# Patient Record
Sex: MALE | Race: WHITE | NOT HISPANIC OR LATINO | Employment: OTHER | URBAN - NONMETROPOLITAN AREA
[De-identification: names, ages, dates, MRNs, and addresses within clinical notes are randomized per-mention and may not be internally consistent; named-entity substitution may affect disease eponyms.]

---

## 2020-06-15 ENCOUNTER — APPOINTMENT (OUTPATIENT)
Dept: ULTRASOUND IMAGING | Facility: HOSPITAL | Age: 85
End: 2020-06-15

## 2020-06-15 ENCOUNTER — APPOINTMENT (OUTPATIENT)
Dept: MRI IMAGING | Facility: HOSPITAL | Age: 85
End: 2020-06-15

## 2020-06-15 ENCOUNTER — HOSPITAL ENCOUNTER (INPATIENT)
Facility: HOSPITAL | Age: 85
LOS: 4 days | Discharge: HOME | End: 2020-06-19
Attending: INTERNAL MEDICINE | Admitting: INTERNAL MEDICINE

## 2020-06-15 DIAGNOSIS — K81.9 CHOLECYSTITIS: ICD-10-CM

## 2020-06-15 PROBLEM — K85.90 ACUTE PANCREATITIS: Status: ACTIVE | Noted: 2020-06-15

## 2020-06-15 LAB
ALBUMIN SERPL-MCNC: 3.3 G/DL (ref 3.5–5.2)
ALBUMIN SERPL-MCNC: 3.3 G/DL (ref 3.5–5.2)
ALBUMIN/GLOB SERPL: 1.1 G/DL
ALBUMIN/GLOB SERPL: 1.1 G/DL
ALP SERPL-CCNC: 155 U/L (ref 39–117)
ALP SERPL-CCNC: 160 U/L (ref 39–117)
ALT SERPL W P-5'-P-CCNC: 87 U/L (ref 1–41)
ALT SERPL W P-5'-P-CCNC: 87 U/L (ref 1–41)
AMYLASE SERPL-CCNC: 872 U/L (ref 28–100)
ANION GAP SERPL CALCULATED.3IONS-SCNC: 9 MMOL/L (ref 5–15)
ANION GAP SERPL CALCULATED.3IONS-SCNC: 9 MMOL/L (ref 5–15)
AST SERPL-CCNC: 141 U/L (ref 1–40)
AST SERPL-CCNC: 144 U/L (ref 1–40)
BASOPHILS # BLD AUTO: 0.02 10*3/MM3 (ref 0–0.2)
BASOPHILS # BLD AUTO: 0.04 10*3/MM3 (ref 0–0.2)
BASOPHILS NFR BLD AUTO: 0.1 % (ref 0–1.5)
BASOPHILS NFR BLD AUTO: 0.2 % (ref 0–1.5)
BILIRUB SERPL-MCNC: 4.4 MG/DL (ref 0.2–1.2)
BILIRUB SERPL-MCNC: 4.5 MG/DL (ref 0.2–1.2)
BUN BLD-MCNC: 25 MG/DL (ref 8–23)
BUN BLD-MCNC: 25 MG/DL (ref 8–23)
BUN/CREAT SERPL: 17.1 (ref 7–25)
BUN/CREAT SERPL: 17.6 (ref 7–25)
CALCIUM SPEC-SCNC: 8.8 MG/DL (ref 8.6–10.5)
CALCIUM SPEC-SCNC: 8.9 MG/DL (ref 8.6–10.5)
CHLORIDE SERPL-SCNC: 105 MMOL/L (ref 98–107)
CHLORIDE SERPL-SCNC: 106 MMOL/L (ref 98–107)
CO2 SERPL-SCNC: 22 MMOL/L (ref 22–29)
CO2 SERPL-SCNC: 23 MMOL/L (ref 22–29)
CREAT BLD-MCNC: 1.42 MG/DL (ref 0.76–1.27)
CREAT BLD-MCNC: 1.46 MG/DL (ref 0.76–1.27)
DEPRECATED RDW RBC AUTO: 40.6 FL (ref 37–54)
DEPRECATED RDW RBC AUTO: 41.7 FL (ref 37–54)
EOSINOPHIL # BLD AUTO: 0 10*3/MM3 (ref 0–0.4)
EOSINOPHIL # BLD AUTO: 0.01 10*3/MM3 (ref 0–0.4)
EOSINOPHIL NFR BLD AUTO: 0 % (ref 0.3–6.2)
EOSINOPHIL NFR BLD AUTO: 0.1 % (ref 0.3–6.2)
ERYTHROCYTE [DISTWIDTH] IN BLOOD BY AUTOMATED COUNT: 12.6 % (ref 12.3–15.4)
ERYTHROCYTE [DISTWIDTH] IN BLOOD BY AUTOMATED COUNT: 12.7 % (ref 12.3–15.4)
GFR SERPL CREATININE-BSD FRML MDRD: 46 ML/MIN/1.73
GFR SERPL CREATININE-BSD FRML MDRD: 48 ML/MIN/1.73
GLOBULIN UR ELPH-MCNC: 3 GM/DL
GLOBULIN UR ELPH-MCNC: 3 GM/DL
GLUCOSE BLD-MCNC: 129 MG/DL (ref 65–99)
GLUCOSE BLD-MCNC: 130 MG/DL (ref 65–99)
HCT VFR BLD AUTO: 34 % (ref 37.5–51)
HCT VFR BLD AUTO: 34.2 % (ref 37.5–51)
HGB BLD-MCNC: 11.8 G/DL (ref 13–17.7)
HGB BLD-MCNC: 11.9 G/DL (ref 13–17.7)
IMM GRANULOCYTES # BLD AUTO: 0.13 10*3/MM3 (ref 0–0.05)
IMM GRANULOCYTES # BLD AUTO: 0.13 10*3/MM3 (ref 0–0.05)
IMM GRANULOCYTES NFR BLD AUTO: 0.7 % (ref 0–0.5)
IMM GRANULOCYTES NFR BLD AUTO: 0.8 % (ref 0–0.5)
LIPASE SERPL-CCNC: 1457 U/L (ref 13–60)
LYMPHOCYTES # BLD AUTO: 1.36 10*3/MM3 (ref 0.7–3.1)
LYMPHOCYTES # BLD AUTO: 1.54 10*3/MM3 (ref 0.7–3.1)
LYMPHOCYTES NFR BLD AUTO: 8 % (ref 19.6–45.3)
LYMPHOCYTES NFR BLD AUTO: 8.8 % (ref 19.6–45.3)
MAGNESIUM SERPL-MCNC: 1.8 MG/DL (ref 1.6–2.4)
MCH RBC QN AUTO: 30.8 PG (ref 26.6–33)
MCH RBC QN AUTO: 31.1 PG (ref 26.6–33)
MCHC RBC AUTO-ENTMCNC: 34.7 G/DL (ref 31.5–35.7)
MCHC RBC AUTO-ENTMCNC: 34.8 G/DL (ref 31.5–35.7)
MCV RBC AUTO: 88.8 FL (ref 79–97)
MCV RBC AUTO: 89.3 FL (ref 79–97)
MONOCYTES # BLD AUTO: 1.43 10*3/MM3 (ref 0.1–0.9)
MONOCYTES # BLD AUTO: 1.44 10*3/MM3 (ref 0.1–0.9)
MONOCYTES NFR BLD AUTO: 8.2 % (ref 5–12)
MONOCYTES NFR BLD AUTO: 8.5 % (ref 5–12)
NEUTROPHILS # BLD AUTO: 14.04 10*3/MM3 (ref 1.7–7)
NEUTROPHILS # BLD AUTO: 14.36 10*3/MM3 (ref 1.7–7)
NEUTROPHILS NFR BLD AUTO: 82.1 % (ref 42.7–76)
NEUTROPHILS NFR BLD AUTO: 82.5 % (ref 42.7–76)
NRBC BLD AUTO-RTO: 0 /100 WBC (ref 0–0.2)
NRBC BLD AUTO-RTO: 0 /100 WBC (ref 0–0.2)
PHOSPHATE SERPL-MCNC: 3.2 MG/DL (ref 2.5–4.5)
PLATELET # BLD AUTO: 137 10*3/MM3 (ref 140–450)
PLATELET # BLD AUTO: 138 10*3/MM3 (ref 140–450)
PMV BLD AUTO: 10.4 FL (ref 6–12)
PMV BLD AUTO: 10.5 FL (ref 6–12)
POTASSIUM BLD-SCNC: 4.4 MMOL/L (ref 3.5–5.2)
POTASSIUM BLD-SCNC: 4.5 MMOL/L (ref 3.5–5.2)
PROT SERPL-MCNC: 6.3 G/DL (ref 6–8.5)
PROT SERPL-MCNC: 6.3 G/DL (ref 6–8.5)
RBC # BLD AUTO: 3.83 10*6/MM3 (ref 4.14–5.8)
RBC # BLD AUTO: 3.83 10*6/MM3 (ref 4.14–5.8)
SODIUM BLD-SCNC: 136 MMOL/L (ref 136–145)
SODIUM BLD-SCNC: 138 MMOL/L (ref 136–145)
TRIGL SERPL-MCNC: 78 MG/DL (ref 0–150)
TSH SERPL DL<=0.05 MIU/L-ACNC: 0.9 UIU/ML (ref 0.27–4.2)
WBC NRBC COR # BLD: 17 10*3/MM3 (ref 3.4–10.8)
WBC NRBC COR # BLD: 17.5 10*3/MM3 (ref 3.4–10.8)

## 2020-06-15 PROCEDURE — 83735 ASSAY OF MAGNESIUM: CPT | Performed by: INTERNAL MEDICINE

## 2020-06-15 PROCEDURE — 87040 BLOOD CULTURE FOR BACTERIA: CPT | Performed by: NURSE PRACTITIONER

## 2020-06-15 PROCEDURE — 99232 SBSQ HOSP IP/OBS MODERATE 35: CPT | Performed by: INTERNAL MEDICINE

## 2020-06-15 PROCEDURE — 84478 ASSAY OF TRIGLYCERIDES: CPT | Performed by: NURSE PRACTITIONER

## 2020-06-15 PROCEDURE — 85025 COMPLETE CBC W/AUTO DIFF WBC: CPT | Performed by: INTERNAL MEDICINE

## 2020-06-15 PROCEDURE — 76705 ECHO EXAM OF ABDOMEN: CPT

## 2020-06-15 PROCEDURE — 80053 COMPREHEN METABOLIC PANEL: CPT | Performed by: INTERNAL MEDICINE

## 2020-06-15 PROCEDURE — 84443 ASSAY THYROID STIM HORMONE: CPT | Performed by: INTERNAL MEDICINE

## 2020-06-15 PROCEDURE — 83690 ASSAY OF LIPASE: CPT | Performed by: INTERNAL MEDICINE

## 2020-06-15 PROCEDURE — 82150 ASSAY OF AMYLASE: CPT | Performed by: INTERNAL MEDICINE

## 2020-06-15 PROCEDURE — 25010000002 PIPERACILLIN SOD-TAZOBACTAM PER 1 G: Performed by: INTERNAL MEDICINE

## 2020-06-15 PROCEDURE — 84100 ASSAY OF PHOSPHORUS: CPT | Performed by: INTERNAL MEDICINE

## 2020-06-15 RX ORDER — SODIUM CHLORIDE 9 MG/ML
100 INJECTION, SOLUTION INTRAVENOUS CONTINUOUS
Status: DISCONTINUED | OUTPATIENT
Start: 2020-06-15 | End: 2020-06-19 | Stop reason: HOSPADM

## 2020-06-15 RX ORDER — ONDANSETRON 4 MG/1
4 TABLET, FILM COATED ORAL EVERY 6 HOURS PRN
Status: DISCONTINUED | OUTPATIENT
Start: 2020-06-15 | End: 2020-06-19 | Stop reason: HOSPADM

## 2020-06-15 RX ORDER — ACETAMINOPHEN 160 MG/5ML
650 SOLUTION ORAL EVERY 4 HOURS PRN
Status: DISCONTINUED | OUTPATIENT
Start: 2020-06-15 | End: 2020-06-18

## 2020-06-15 RX ORDER — FAMOTIDINE 40 MG/1
40 TABLET, FILM COATED ORAL DAILY
Status: DISCONTINUED | OUTPATIENT
Start: 2020-06-15 | End: 2020-06-19 | Stop reason: HOSPADM

## 2020-06-15 RX ORDER — LANOLIN ALCOHOL/MO/W.PET/CERES
5 CREAM (GRAM) TOPICAL NIGHTLY PRN
Status: DISCONTINUED | OUTPATIENT
Start: 2020-06-15 | End: 2020-06-19 | Stop reason: HOSPADM

## 2020-06-15 RX ORDER — BISACODYL 5 MG/1
5 TABLET, DELAYED RELEASE ORAL DAILY PRN
Status: DISCONTINUED | OUTPATIENT
Start: 2020-06-15 | End: 2020-06-19 | Stop reason: HOSPADM

## 2020-06-15 RX ORDER — ONDANSETRON 2 MG/ML
4 INJECTION INTRAMUSCULAR; INTRAVENOUS EVERY 6 HOURS PRN
Status: DISCONTINUED | OUTPATIENT
Start: 2020-06-15 | End: 2020-06-19 | Stop reason: HOSPADM

## 2020-06-15 RX ORDER — FUROSEMIDE 20 MG/1
20 TABLET ORAL DAILY PRN
COMMUNITY

## 2020-06-15 RX ORDER — BISACODYL 10 MG
10 SUPPOSITORY, RECTAL RECTAL DAILY PRN
Status: DISCONTINUED | OUTPATIENT
Start: 2020-06-15 | End: 2020-06-19 | Stop reason: HOSPADM

## 2020-06-15 RX ORDER — ACETAMINOPHEN 325 MG/1
650 TABLET ORAL EVERY 4 HOURS PRN
Status: DISCONTINUED | OUTPATIENT
Start: 2020-06-15 | End: 2020-06-18

## 2020-06-15 RX ORDER — METOPROLOL TARTRATE 100 MG/1
100 TABLET ORAL 2 TIMES DAILY
COMMUNITY

## 2020-06-15 RX ORDER — CALCIUM CARBONATE 200(500)MG
2 TABLET,CHEWABLE ORAL 2 TIMES DAILY PRN
Status: DISCONTINUED | OUTPATIENT
Start: 2020-06-15 | End: 2020-06-19 | Stop reason: HOSPADM

## 2020-06-15 RX ORDER — CHOLECALCIFEROL (VITAMIN D3) 125 MCG
500 CAPSULE ORAL DAILY
COMMUNITY

## 2020-06-15 RX ORDER — ACETAMINOPHEN 650 MG/1
650 SUPPOSITORY RECTAL EVERY 4 HOURS PRN
Status: DISCONTINUED | OUTPATIENT
Start: 2020-06-15 | End: 2020-06-18

## 2020-06-15 RX ORDER — SODIUM CHLORIDE 0.9 % (FLUSH) 0.9 %
10 SYRINGE (ML) INJECTION AS NEEDED
Status: DISCONTINUED | OUTPATIENT
Start: 2020-06-15 | End: 2020-06-19 | Stop reason: HOSPADM

## 2020-06-15 RX ORDER — SODIUM CHLORIDE 0.9 % (FLUSH) 0.9 %
10 SYRINGE (ML) INJECTION EVERY 12 HOURS SCHEDULED
Status: DISCONTINUED | OUTPATIENT
Start: 2020-06-15 | End: 2020-06-19 | Stop reason: HOSPADM

## 2020-06-15 RX ADMIN — SODIUM CHLORIDE, PRESERVATIVE FREE 10 ML: 5 INJECTION INTRAVENOUS at 20:23

## 2020-06-15 RX ADMIN — PIPERACILLIN SODIUM AND TAZOBACTAM SODIUM 3.38 G: 3; .375 INJECTION, POWDER, LYOPHILIZED, FOR SOLUTION INTRAVENOUS at 20:22

## 2020-06-15 RX ADMIN — PIPERACILLIN SODIUM AND TAZOBACTAM SODIUM 3.38 G: 3; .375 INJECTION, POWDER, LYOPHILIZED, FOR SOLUTION INTRAVENOUS at 11:56

## 2020-06-15 RX ADMIN — PIPERACILLIN SODIUM AND TAZOBACTAM SODIUM 3.38 G: 3; .375 INJECTION, POWDER, LYOPHILIZED, FOR SOLUTION INTRAVENOUS at 06:11

## 2020-06-15 NOTE — CONSULTS
SUBJECTIVE:   6/15/2020    Name: Kory Montalvo  DOD: 1935    REASON FOR CONSULT: Acute pancreatitis    Chief Complaint:   No chief complaint on file.      Subjective     Patient is 84 y.o. male with past medical history of hypertension, tobacco usage presents with upper abdominal pain associated with nausea and vomiting of 3 days duration.  Patient had progressively worsening symptoms and subsequently presented to the emergency room at [facility].  He was noted to have elevated lipase of over 3000 and elevated liver enzymes with CT abdomen and pelvis consistent with dilated gallbladder with pericholecystic fluid and thickened wall.  CT report did not mention anything about pancreas.  He was also noted to have elevated white count at 17,000.  Patient feels better currently.  Abdominal pain is improving.  No further nausea or vomiting.  He denied history of similar symptoms in the past.  Also denied history of alcohol usage.  Right upper quadrant sonogram was consistent with well distended and normal-appearing gallbladder and 4 mm common bile duct.  No pericholecystic fluid or gallbladder wall thickening noted.  Lipase this morning was 1457.     ROS/HISTORY/ CURRENT MEDICATIONS/OBJECTIVE/VS/PE:   Review of Systems:   Review of Systems   Constitutional: Negative for chills, fatigue, fever and unexpected weight change.   HENT: Negative for congestion, ear discharge, hearing loss, nosebleeds and sore throat.    Eyes: Negative for pain, discharge and redness.   Respiratory: Negative for cough, chest tightness, shortness of breath and wheezing.    Cardiovascular: Negative for chest pain and palpitations.   Gastrointestinal: Positive for abdominal pain, nausea and vomiting. Negative for abdominal distention, blood in stool, constipation and diarrhea.   Endocrine: Negative for cold intolerance, polydipsia, polyphagia and polyuria.   Genitourinary: Negative for dysuria, flank pain, frequency, hematuria  and urgency.   Musculoskeletal: Negative for arthralgias, back pain, joint swelling and myalgias.   Skin: Negative for color change, pallor and rash.   Neurological: Negative for tremors, seizures, syncope, weakness and headaches.   Hematological: Negative for adenopathy. Does not bruise/bleed easily.   Psychiatric/Behavioral: Negative for behavioral problems, confusion, dysphoric mood, hallucinations and suicidal ideas. The patient is not nervous/anxious.        History:   No past medical history on file.  No past surgical history on file.  No family history on file.  Social History     Tobacco Use   • Smoking status: Former Smoker     Packs/day: 1.50     Years: 52.00     Pack years: 78.00     Types: Cigarettes   • Smokeless tobacco: Former User     Types: Chew   Substance Use Topics   • Alcohol use: Not on file   • Drug use: Not on file     Medications Prior to Admission   Medication Sig Dispense Refill Last Dose   • furosemide (LASIX) 20 MG tablet Take 20 mg by mouth Daily As Needed. 1/2 tablet prn for leg swelling      • metoprolol tartrate (LOPRESSOR) 100 MG tablet Take 100 mg by mouth 2 (Two) Times a Day.      • vitamin B-12 (CYANOCOBALAMIN) 500 MCG tablet Take 500 mcg by mouth Daily.        Allergies:  Patient has no known allergies.    I have reviewed the patient's medical history, surgical history and family history in the available medical record system.     Current Medications:     Current Facility-Administered Medications   Medication Dose Route Frequency Provider Last Rate Last Dose   • acetaminophen (TYLENOL) tablet 650 mg  650 mg Oral Q4H PRN Vincent Ambrose MD        Or   • acetaminophen (TYLENOL) 160 MG/5ML solution 650 mg  650 mg Oral Q4H PRN Vincent Ambrose MD        Or   • acetaminophen (TYLENOL) suppository 650 mg  650 mg Rectal Q4H PRN Vincent Ambrose MD       • bisacodyl (DULCOLAX) EC tablet 5 mg  5 mg Oral Daily PRN Vincent Ambrose MD       • bisacodyl (DULCOLAX) suppository 10 mg  10 mg  Rectal Daily PRN Vincent Ambrose MD       • calcium carbonate (TUMS) chewable tablet 500 mg (200 mg elemental)  2 tablet Oral BID PRN Vincent Ambrose MD       • famotidine (PEPCID) tablet 40 mg  40 mg Oral Daily Vincent Ambrose MD       • HYDROmorphone (DILAUDID) injection 0.5 mg  0.5 mg Intravenous Q4H PRN Vincent Ambrose MD       • magnesium hydroxide (MILK OF MAGNESIA) suspension 2400 mg/10mL 10 mL  10 mL Oral Daily PRN Vincent Ambrose MD       • melatonin tablet 5.25 mg  5.25 mg Oral Nightly PRN Vincent Ambrose MD       • ondansetron (ZOFRAN) tablet 4 mg  4 mg Oral Q6H PRN Vincent Ambrose MD        Or   • ondansetron (ZOFRAN) injection 4 mg  4 mg Intravenous Q6H PRN Vincent Ambrose MD       • Pharmacy to Dose Zosyn   Does not apply Continuous PRN Vincent Ambrose MD       • piperacillin-tazobactam (ZOSYN) 3.375 g/100 mL 0.9% NS IVPB (mbp)  3.375 g Intravenous Q8H Vincent Ambrose MD   3.375 g at 06/15/20 1156   • sodium chloride 0.9 % flush 10 mL  10 mL Intravenous Q12H Vincent Ambrose MD       • sodium chloride 0.9 % flush 10 mL  10 mL Intravenous PRN Vincent Ambrose MD       • sodium chloride 0.9 % infusion  125 mL/hr Intravenous Continuous Chris Rojo, APPLE 125 mL/hr at 06/15/20 1337 125 mL/hr at 06/15/20 1337       Objective     Physical Exam:   Temp:  [96.6 °F (35.9 °C)-98.9 °F (37.2 °C)] 96.6 °F (35.9 °C)  Heart Rate:  [61-73] 61  Resp:  [18] 18  BP: (141-154)/(66-75) 141/66    Physical Exam:  General Appearance:    Alert, cooperative, in no acute distress   Head:    Normocephalic, without obvious abnormality, atraumatic   Eyes:            Lids and lashes normal, conjunctivae and sclerae normal, no   icterus, no pallor, corneas clear, PERRLA   Ears:    Ears appear intact with no abnormalities noted   Throat:   No oral lesions, no thrush, oral mucosa moist   Neck:   No adenopathy, supple, trachea midline, no thyromegaly, no     carotid bruit, no JVD   Back:     No kyphosis present, no scoliosis  present, no skin lesions,       erythema or scars, no tenderness to percussion or                   palpation,   range of motion normal   Lungs:     Clear to auscultation,respirations regular, even and                   unlabored    Heart:    Regular rhythm and normal rate, normal S1 and S2, no            murmur, no gallop, no rub, no click   Breast Exam:    Deferred   Abdomen:     Normal bowel sounds, no masses, no organomegaly, soft        non-tender, non-distended, no guarding, no rebound                 tenderness   Genitalia:    Deferred   Extremities:   Moves all extremities well, no edema, no cyanosis, no              redness   Pulses:   Pulses palpable and equal bilaterally   Skin:   No bleeding, bruising or rash   Lymph nodes:   No palpable adenopathy   Neurologic:   Cranial nerves 2 - 12 grossly intact, sensation intact, DTR        present and equal bilaterally      Results Review:     Lab Results   Component Value Date    WBC 17.50 (H) 06/15/2020    WBC 17.00 (H) 06/15/2020    HGB 11.8 (L) 06/15/2020    HGB 11.9 (L) 06/15/2020    HCT 34.0 (L) 06/15/2020    HCT 34.2 (L) 06/15/2020     (L) 06/15/2020     (L) 06/15/2020     Results from last 7 days   Lab Units 06/15/20  0601   ALK PHOS U/L 155*  160*   ALT (SGPT) U/L 87*  87*   AST (SGOT) U/L 141*  144*     Results from last 7 days   Lab Units 06/15/20  0601   BILIRUBIN mg/dL 4.4*  4.5*   ALK PHOS U/L 155*  160*     Lipase   Date Value Ref Range Status   06/15/2020 1,457 (H) 13 - 60 U/L Final     No results found for: INR      Radiology Review:  Imaging Results (Last 72 Hours)     Procedure Component Value Units Date/Time    US Gallbladder [[id number]] Collected:  06/15/20 1058     Updated:  06/15/20 1146    Narrative:       EXAM DESCRIPTION:     US GALLBLADDER    CLINICAL HISTORY:     84 years  Male  acute pancreatitis    COMPARISON:     None available    TECHNIQUE:     Transverse and longitudinal images were obtained throughout the  right  upper quadrant from a transabdominal approach.    FINDINGS:     The gallbladder is well-distended. There is no evidence of  cholelithiasis, gallbladder wall thickening or pericholecystic  fluid. The common bile duct measures 4 mm in diameter without  evidence of intra or extrahepatic bile or dilatation.    Images through the liver demonstrate no focal abnormality with  normal echogenicity. The right kidney demonstrates no evidence of  obstruction. There is diffuse cortical thinning of the right  renal parenchyma.    The pancreas is fairly well visualized and demonstrates no focal  abnormality. There are no peripancreatic fluid  collections/effusion and/or pseudocyst identified.      Impression:           1. No evidence of cholelithiasis.  2. Unremarkable ultrasonographic appearance of the pancreas.  3. Mild cortical thinning of the right renal cortex.        Electronically signed by:  Teresita Richards MD  6/15/2020 11:45 AM  CDT Workstation: [id number]          I reviewed the patient's new clinical results.    I reviewed the patient's new imaging results and agree with the interpretation.     ASSESSMENT/PLAN:   ASSESSMENT: 1.  Upper abdominal pain with nausea and vomiting likely due to acute pancreatitis.  Likely biliary pancreatitis but no gallstones seen upon right upper quadrant sonogram or CT abdomen and pelvis.  Need to rule out neoplasia given the advanced age of the patient.  2.  Elevated liver enzymes, improving.    PLAN: 1.  Continue bowel rest, antibiotics and PPI.  2.  Obtain MRCP for further evaluation of pancreas, gallbladder and CBD..  3.  Follow LFTs closely and avoid hypotension and hepatotoxic medications.  The risks, benefits, and alternatives of this procedure have been discussed with the patient or the responsible party. The patient understands and agrees to proceed.         Hipolito Stringer MD  06/15/20  15:03

## 2020-06-15 NOTE — PLAN OF CARE
Problem: Patient Care Overview  Goal: Plan of Care Review  Outcome: Ongoing (interventions implemented as appropriate)  Flowsheets (Taken 6/15/2020 5684)  Progress: no change  Plan of Care Reviewed With: patient  Outcome Summary: vss. no complaints of pain or discomfort. resting between care. will continue to monitor.

## 2020-06-15 NOTE — H&P
[facility] Medicine Admission      Date of Admission: 6/15/2020      Primary Care Physician: Provider, No Known      Chief Complaint:     Abdominal pain, vomiting  HPI:    84 of gentleman with past medical history significant for hypertension who presented to the hospital with complaints of abdominal pain from an outside facility.  Patient reports that his abdominal pain started about 3 days ago.  It was gradual in onset, progressive, aggravated by movement, 10/10 intensity at its peak, dull aching pain.  It is primarily located in epigastric as well as left upper quadrant region.  He also had associated nausea and multiple episodes of vomiting.  He denies any history of alcohol abuse or any previous history of pancreatitis.    On arrival to the ER at outside facility, he was found to have acute pancreatitis.  CT scan abdomen/pelvis was also remarkable for pericholecystic fluid.  He was given IV antibiotics and GI was contacted from the outside facility who recommended the patient to be transferred to our service for further evaluation and management.        Concurrent Medical History:  has no past medical history on file.  Hypertension    Past Surgical History:  has no past surgical history on file.  None    Family History: family history is not on file.  Hypertension    Social History:  reports that he has quit smoking. His smoking use included cigarettes. He has a 78.00 pack-year smoking history. He has quit using smokeless tobacco.  His smokeless tobacco use included chew.    Allergies: No Known Allergies    Medications:   Prior to Admission medications    Medication Sig Start Date End Date Taking? Authorizing Provider   furosemide (LASIX) 20 MG tablet Take 20 mg by mouth Daily As Needed. 1/2 tablet prn for leg swelling   Yes Theron Merlos MD   metoprolol tartrate (LOPRESSOR) 100 MG tablet Take 100 mg by mouth 2 (Two) Times a Day.   Yes Theron Merlos MD    vitamin B-12 (CYANOCOBALAMIN) 500 MCG tablet Take 500 mcg by mouth Daily.   Yes Provider, Theron, MD       Review of Systems:  Review of Systems   Otherwise complete ROS is negative except as mentioned above.    Physical Exam:   Temp:  [98.9 °F (37.2 °C)] 98.9 °F (37.2 °C)  Heart Rate:  [64-73] 64  Resp:  [18] 18  BP: (142-154)/(67-75) 154/67  Physical Exam    General: [Appears stated age, alert, oriented ×3, cooperative]  HEENT: [Normocephalic, atraumatic, EOMI, PERRLA, unremarkable external ear, moist mucous membranes, supple neck, no lymphadenopathy]  CVS: [RRR, S1, S2, no murmurs, normal peripheral pulses]  Respiratory: [CTA bilaterally, symmetrical expansion, no wheezing, no rales, no crackles]  Gastrointestinal: [Soft, epigastric tenderness without any guarding/rigidity/rebound]  Musculoskeletal: [Grossly normal, no tenderness, normal ROM]  Skin: [No rashes, no erythema, no lesions]  Extremities: [Normal inspection.  No edema, no cyanosis, no clubbing.  Normal capillary refill]  Neuro: [Alert, oriented ×3, grossly normal motor and sensory function]  Psychiatry: [No anxiety, no depression, nonsuicidal]      Results Reviewed:  I have personally reviewed current lab, radiology, and data and agree with results.  Lab Results (last 24 hours)     Procedure Component Value Units Date/Time    TSH [[id number]]  (Normal) Collected:  06/15/20 0601    Specimen:  Blood Updated:  06/15/20 0657     TSH 0.902 uIU/mL     Comprehensive Metabolic Panel [[id number]]  (Abnormal) Collected:  06/15/20 0601    Specimen:  Blood Updated:  06/15/20 0655     Glucose 129 mg/dL      BUN 25 mg/dL      Creatinine 1.42 mg/dL      Sodium 136 mmol/L      Potassium 4.4 mmol/L      Chloride 105 mmol/L      CO2 22.0 mmol/L      Calcium 8.8 mg/dL      Total Protein 6.3 g/dL      Albumin 3.30 g/dL      ALT (SGPT) 87 U/L      AST (SGOT) 141 U/L      Alkaline Phosphatase 155 U/L      Total Bilirubin 4.4 mg/dL      eGFR Non  Amer 48  mL/min/1.73      Globulin 3.0 gm/dL      A/G Ratio 1.1 g/dL      BUN/Creatinine Ratio 17.6     Anion Gap 9.0 mmol/L     Narrative:       GFR Normal >60  Chronic Kidney Disease <60  Kidney Failure <15      Comprehensive Metabolic Panel [[id number]]  (Abnormal) Collected:  06/15/20 0601    Specimen:  Blood Updated:  06/15/20 0652     Glucose 130 mg/dL      BUN 25 mg/dL      Creatinine 1.46 mg/dL      Sodium 138 mmol/L      Potassium 4.5 mmol/L      Chloride 106 mmol/L      CO2 23.0 mmol/L      Calcium 8.9 mg/dL      Total Protein 6.3 g/dL      Albumin 3.30 g/dL      ALT (SGPT) 87 U/L      AST (SGOT) 144 U/L      Alkaline Phosphatase 160 U/L      Total Bilirubin 4.5 mg/dL      eGFR Non African Amer 46 mL/min/1.73      Globulin 3.0 gm/dL      A/G Ratio 1.1 g/dL      BUN/Creatinine Ratio 17.1     Anion Gap 9.0 mmol/L     Narrative:       GFR Normal >60  Chronic Kidney Disease <60  Kidney Failure <15      Magnesium [[id number]]  (Normal) Collected:  06/15/20 0601    Specimen:  Blood Updated:  06/15/20 0652     Magnesium 1.8 mg/dL     Phosphorus [[id number]]  (Normal) Collected:  06/15/20 0601    Specimen:  Blood Updated:  06/15/20 0652     Phosphorus 3.2 mg/dL     CBC Auto Differential [[id number]]  (Abnormal) Collected:  06/15/20 0601    Specimen:  Blood Updated:  06/15/20 0628     WBC 17.50 10*3/mm3      RBC 3.83 10*6/mm3      Hemoglobin 11.8 g/dL      Hematocrit 34.0 %      MCV 88.8 fL      MCH 30.8 pg      MCHC 34.7 g/dL      RDW 12.6 %      RDW-SD 40.6 fl      MPV 10.5 fL      Platelets 137 10*3/mm3      Neutrophil % 82.1 %      Lymphocyte % 8.8 %      Monocyte % 8.2 %      Eosinophil % 0.0 %      Basophil % 0.2 %      Immature Grans % 0.7 %      Neutrophils, Absolute 14.36 10*3/mm3      Lymphocytes, Absolute 1.54 10*3/mm3      Monocytes, Absolute 1.43 10*3/mm3      Eosinophils, Absolute 0.00 10*3/mm3      Basophils, Absolute 0.04 10*3/mm3      Immature Grans, Absolute 0.13 10*3/mm3      nRBC 0.0 /100 WBC     CBC &  Differential [[id number]] Collected:  06/15/20 0601    Specimen:  Blood Updated:  06/15/20 0626    Narrative:       The following orders were created for panel order CBC & Differential.  Procedure                               Abnormality         Status                     ---------                               -----------         ------                     CBC Auto Differential[[id number]]        Abnormal            Final result                 Please view results for these tests on the individual orders.    CBC Auto Differential [[id number]]  (Abnormal) Collected:  06/15/20 0601    Specimen:  Blood Updated:  06/15/20 0626     WBC 17.00 10*3/mm3      RBC 3.83 10*6/mm3      Hemoglobin 11.9 g/dL      Hematocrit 34.2 %      MCV 89.3 fL      MCH 31.1 pg      MCHC 34.8 g/dL      RDW 12.7 %      RDW-SD 41.7 fl      MPV 10.4 fL      Platelets 138 10*3/mm3      Neutrophil % 82.5 %      Lymphocyte % 8.0 %      Monocyte % 8.5 %      Eosinophil % 0.1 %      Basophil % 0.1 %      Immature Grans % 0.8 %      Neutrophils, Absolute 14.04 10*3/mm3      Lymphocytes, Absolute 1.36 10*3/mm3      Monocytes, Absolute 1.44 10*3/mm3      Eosinophils, Absolute 0.01 10*3/mm3      Basophils, Absolute 0.02 10*3/mm3      Immature Grans, Absolute 0.13 10*3/mm3      nRBC 0.0 /100 WBC         Imaging Results (Last 24 Hours)     ** No results found for the last 24 hours. **            Assessment:    Active Hospital Problems    Diagnosis   • Acute pancreatitis             Plan:  84-year-old gentleman with past medical history significant for hypertension who presented to the hospital with complaint of abdominal pain likely secondary to acute pancreatitis and possible CBD obstruction.    Acute pancreatitis:  Admit to the [facility] bed  Close vitals monitoring  N.p.o.  IV Dilaudid as needed for pain control  IV fluids  GI consult    ?  Acute cholecystitis:  Pericholecystic fluid on CT scan abdomen/pelvis from outside facility  Initiated on IV Zosyn    ?   CBD obstruction:  Hepatic function panel consistent with possible CBD obstruction  GI consult  Consider MRCP if okay with GI    Hypertension:  Hold metoprolol            Patient is full code  Estimated length of stay greater than 2 midnights  DVT prophylaxis SCDs        I discussed the patient's findings and my recommendations with:   Patient    Vincent Ambrose MD

## 2020-06-15 NOTE — PROGRESS NOTES
[facility] Medicine Services  INPATIENT PROGRESS NOTE    Length of Stay: 0  Date of Admission: 6/15/2020  Primary Care Physician: Provider, No Known    Subjective   Chief Complaint: Abdominal pain, vomiting  HPI:  84 year old male with a history of HTN who presented with abdominal pain and vomiting for about 3ad.  CT in outside hospital revealed pericholecystic fluid.  Laboratory evaluation noted elevated pancreatic enzymes.  RUQ ultrasound noted no cholelithiasis.  His pain is better and he denies vomiting this morning.    Review of Systems   Constitutional: Negative for chills and fever.   Respiratory: Negative for shortness of breath.    Cardiovascular: Negative for chest pain.   Gastrointestinal: Positive for abdominal pain and nausea. Negative for vomiting.        All pertinent negatives and positives are as above. All other systems have been reviewed and are negative unless otherwise stated.     Objective    Temp:  [96.6 °F (35.9 °C)-98.9 °F (37.2 °C)] 96.6 °F (35.9 °C)  Heart Rate:  [61-73] 61  Resp:  [18] 18  BP: (141-154)/(66-75) 141/66    Physical Exam   Constitutional: He is oriented to person, place, and time. He appears well-developed and well-nourished. No distress.   HENT:   Head: Normocephalic and atraumatic.   Eyes: Conjunctivae are normal.   Cardiovascular: Normal rate and regular rhythm.   Pulmonary/Chest: Effort normal and breath sounds normal. No respiratory distress.   Abdominal: Soft. Bowel sounds are normal. He exhibits no distension. There is tenderness (left sided).   Musculoskeletal: He exhibits no edema or deformity.   Neurological: He is alert and oriented to person, place, and time.   Skin: Skin is warm and dry. He is not diaphoretic. No erythema.   Vitals reviewed.          Results Review:  I have reviewed the labs, radiology results, and diagnostic studies.    Laboratory Data:   Results from last 7 days   Lab Units 06/15/20  0601   SODIUM  mmol/L 136  138   POTASSIUM mmol/L 4.4  4.5   CHLORIDE mmol/L 105  106   CO2 mmol/L 22.0  23.0   BUN mg/dL 25*  25*   CREATININE mg/dL 1.42*  1.46*   GLUCOSE mg/dL 129*  130*   CALCIUM mg/dL 8.8  8.9   BILIRUBIN mg/dL 4.4*  4.5*   ALK PHOS U/L 155*  160*   ALT (SGPT) U/L 87*  87*   AST (SGOT) U/L 141*  144*   ANION GAP mmol/L 9.0  9.0     Estimated Creatinine Clearance: 36.5 mL/min (A) (by C-G formula based on SCr of 1.46 mg/dL (H)).  Results from last 7 days   Lab Units 06/15/20  0601   MAGNESIUM mg/dL 1.8   PHOSPHORUS mg/dL 3.2         Results from last 7 days   Lab Units 06/15/20  0601   WBC 10*3/mm3 17.50*  17.00*   HEMOGLOBIN g/dL 11.8*  11.9*   HEMATOCRIT % 34.0*  34.2*   PLATELETS 10*3/mm3 137*  138*           Culture Data:   No results found for: BLOODCX  No results found for: URINECX  No results found for: RESPCX  No results found for: WOUNDCX  No results found for: STOOLCX  No components found for: BODYFLD    Radiology Data:   Imaging Results (Last 24 Hours)     Procedure Component Value Units Date/Time    US Gallbladder [[id number]] Collected:  06/15/20 1058     Updated:  06/15/20 1146    Narrative:       EXAM DESCRIPTION:     US GALLBLADDER    CLINICAL HISTORY:     84 years  Male  acute pancreatitis    COMPARISON:     None available    TECHNIQUE:     Transverse and longitudinal images were obtained throughout the  right upper quadrant from a transabdominal approach.    FINDINGS:     The gallbladder is well-distended. There is no evidence of  cholelithiasis, gallbladder wall thickening or pericholecystic  fluid. The common bile duct measures 4 mm in diameter without  evidence of intra or extrahepatic bile or dilatation.    Images through the liver demonstrate no focal abnormality with  normal echogenicity. The right kidney demonstrates no evidence of  obstruction. There is diffuse cortical thinning of the right  renal parenchyma.    The pancreas is fairly well visualized and demonstrates  no focal  abnormality. There are no peripancreatic fluid  collections/effusion and/or pseudocyst identified.      Impression:           1. No evidence of cholelithiasis.  2. Unremarkable ultrasonographic appearance of the pancreas.  3. Mild cortical thinning of the right renal cortex.        Electronically signed by:  Teresita Richards MD  6/15/2020 11:45 AM  CDT Workstation: [id number]          I have reviewed the patient's current medications.     Assessment/Plan     Active Hospital Problems    Diagnosis   • Acute pancreatitis       Plan:    NPO  IV fluid:  ml/hr  Zosyn  Blood cultures  MRCP  Check triglycerides  GI consultation appreciated  Pain control: PRN dilaudid  Antiemetics: PRN Zofran  VTE PPx: SCD      The patient was evaluated during the global COVID-19 pandemic, and the diagnosis was suspected/considered upon their initial presentation.  Evaluation, treatment, and testing were consistent with current guidelines for patients who present with complaints or symptoms that may be related to COVID-19.        This document has been electronically signed by APPLE Kaiser on Chloé 15, 2020 13:05

## 2020-06-16 ENCOUNTER — APPOINTMENT (OUTPATIENT)
Dept: NUCLEAR MEDICINE | Facility: HOSPITAL | Age: 85
End: 2020-06-16

## 2020-06-16 LAB
ALBUMIN SERPL-MCNC: 3.3 G/DL (ref 3.5–5.2)
ALBUMIN/GLOB SERPL: 1.1 G/DL
ALP SERPL-CCNC: 148 U/L (ref 39–117)
ALT SERPL W P-5'-P-CCNC: 61 U/L (ref 1–41)
ANION GAP SERPL CALCULATED.3IONS-SCNC: 15 MMOL/L (ref 5–15)
AST SERPL-CCNC: 77 U/L (ref 1–40)
BASOPHILS # BLD AUTO: 0.02 10*3/MM3 (ref 0–0.2)
BASOPHILS NFR BLD AUTO: 0.2 % (ref 0–1.5)
BILIRUB SERPL-MCNC: 4.8 MG/DL (ref 0.2–1.2)
BUN BLD-MCNC: 31 MG/DL (ref 8–23)
BUN/CREAT SERPL: 22.5 (ref 7–25)
CALCIUM SPEC-SCNC: 8.8 MG/DL (ref 8.6–10.5)
CHLORIDE SERPL-SCNC: 106 MMOL/L (ref 98–107)
CO2 SERPL-SCNC: 19 MMOL/L (ref 22–29)
CREAT BLD-MCNC: 1.38 MG/DL (ref 0.76–1.27)
DEPRECATED RDW RBC AUTO: 42 FL (ref 37–54)
EOSINOPHIL # BLD AUTO: 0.04 10*3/MM3 (ref 0–0.4)
EOSINOPHIL NFR BLD AUTO: 0.5 % (ref 0.3–6.2)
ERYTHROCYTE [DISTWIDTH] IN BLOOD BY AUTOMATED COUNT: 12.9 % (ref 12.3–15.4)
GFR SERPL CREATININE-BSD FRML MDRD: 49 ML/MIN/1.73
GLOBULIN UR ELPH-MCNC: 2.9 GM/DL
GLUCOSE BLD-MCNC: 79 MG/DL (ref 65–99)
HCT VFR BLD AUTO: 34.2 % (ref 37.5–51)
HGB BLD-MCNC: 11.3 G/DL (ref 13–17.7)
IMM GRANULOCYTES # BLD AUTO: 0.03 10*3/MM3 (ref 0–0.05)
IMM GRANULOCYTES NFR BLD AUTO: 0.3 % (ref 0–0.5)
LIPASE SERPL-CCNC: 497 U/L (ref 13–60)
LYMPHOCYTES # BLD AUTO: 0.67 10*3/MM3 (ref 0.7–3.1)
LYMPHOCYTES NFR BLD AUTO: 7.8 % (ref 19.6–45.3)
MCH RBC QN AUTO: 30.1 PG (ref 26.6–33)
MCHC RBC AUTO-ENTMCNC: 33 G/DL (ref 31.5–35.7)
MCV RBC AUTO: 91 FL (ref 79–97)
MONOCYTES # BLD AUTO: 0.58 10*3/MM3 (ref 0.1–0.9)
MONOCYTES NFR BLD AUTO: 6.8 % (ref 5–12)
NEUTROPHILS # BLD AUTO: 7.25 10*3/MM3 (ref 1.7–7)
NEUTROPHILS NFR BLD AUTO: 84.4 % (ref 42.7–76)
NRBC BLD AUTO-RTO: 0 /100 WBC (ref 0–0.2)
PLATELET # BLD AUTO: 120 10*3/MM3 (ref 140–450)
PMV BLD AUTO: 10.7 FL (ref 6–12)
POTASSIUM BLD-SCNC: 3.9 MMOL/L (ref 3.5–5.2)
PROT SERPL-MCNC: 6.2 G/DL (ref 6–8.5)
RBC # BLD AUTO: 3.76 10*6/MM3 (ref 4.14–5.8)
SODIUM BLD-SCNC: 140 MMOL/L (ref 136–145)
WBC NRBC COR # BLD: 8.59 10*3/MM3 (ref 3.4–10.8)

## 2020-06-16 PROCEDURE — 83690 ASSAY OF LIPASE: CPT | Performed by: NURSE PRACTITIONER

## 2020-06-16 PROCEDURE — A9537 TC99M MEBROFENIN: HCPCS | Performed by: INTERNAL MEDICINE

## 2020-06-16 PROCEDURE — 25010000002 PIPERACILLIN SOD-TAZOBACTAM PER 1 G: Performed by: INTERNAL MEDICINE

## 2020-06-16 PROCEDURE — 85025 COMPLETE CBC W/AUTO DIFF WBC: CPT | Performed by: NURSE PRACTITIONER

## 2020-06-16 PROCEDURE — 78226 HEPATOBILIARY SYSTEM IMAGING: CPT

## 2020-06-16 PROCEDURE — 99232 SBSQ HOSP IP/OBS MODERATE 35: CPT | Performed by: INTERNAL MEDICINE

## 2020-06-16 PROCEDURE — 0 TECHNETIUM TC 99M MEBROFENIN KIT: Performed by: INTERNAL MEDICINE

## 2020-06-16 PROCEDURE — 80053 COMPREHEN METABOLIC PANEL: CPT | Performed by: NURSE PRACTITIONER

## 2020-06-16 RX ORDER — LABETALOL HYDROCHLORIDE 5 MG/ML
20 INJECTION, SOLUTION INTRAVENOUS EVERY 6 HOURS PRN
Status: DISCONTINUED | OUTPATIENT
Start: 2020-06-16 | End: 2020-06-17

## 2020-06-16 RX ORDER — KIT FOR THE PREPARATION OF TECHNETIUM TC 99M MEBROFENIN 45 MG/10ML
1 INJECTION, POWDER, LYOPHILIZED, FOR SOLUTION INTRAVENOUS
Status: COMPLETED | OUTPATIENT
Start: 2020-06-16 | End: 2020-06-16

## 2020-06-16 RX ADMIN — SODIUM CHLORIDE, PRESERVATIVE FREE 10 ML: 5 INJECTION INTRAVENOUS at 20:21

## 2020-06-16 RX ADMIN — PIPERACILLIN SODIUM AND TAZOBACTAM SODIUM 3.38 G: 3; .375 INJECTION, POWDER, LYOPHILIZED, FOR SOLUTION INTRAVENOUS at 03:31

## 2020-06-16 RX ADMIN — PIPERACILLIN SODIUM AND TAZOBACTAM SODIUM 3.38 G: 3; .375 INJECTION, POWDER, LYOPHILIZED, FOR SOLUTION INTRAVENOUS at 14:28

## 2020-06-16 RX ADMIN — PIPERACILLIN SODIUM AND TAZOBACTAM SODIUM 3.38 G: 3; .375 INJECTION, POWDER, LYOPHILIZED, FOR SOLUTION INTRAVENOUS at 20:18

## 2020-06-16 RX ADMIN — MEBROFENIN 1 DOSE: 45 INJECTION, POWDER, LYOPHILIZED, FOR SOLUTION INTRAVENOUS at 12:10

## 2020-06-16 RX ADMIN — LABETALOL HYDROCHLORIDE 20 MG: 5 INJECTION INTRAVENOUS at 16:57

## 2020-06-16 RX ADMIN — SODIUM CHLORIDE 125 ML/HR: 900 INJECTION, SOLUTION INTRAVENOUS at 02:15

## 2020-06-16 NOTE — PLAN OF CARE
Problem: Patient Care Overview  Goal: Plan of Care Review  Outcome: Ongoing (interventions implemented as appropriate)  Flowsheets  Taken 6/15/2020 1445 by Liz Suresh RN  Progress: no change  Taken 6/16/2020 0015 by Radha Yarbrough, RN  Plan of Care Reviewed With: patient  Outcome Summary: No new complaints at this time. Patient is currently resting. BP slightly elevated, will continue to monitor.

## 2020-06-16 NOTE — PLAN OF CARE
Problem: Patient Care Overview  Goal: Plan of Care Review  Outcome: Ongoing (interventions implemented as appropriate)     Problem: Patient Care Overview  Goal: Individualization and Mutuality  Outcome: Ongoing (interventions implemented as appropriate)     Problem: Patient Care Overview  Goal: Discharge Needs Assessment  Outcome: Ongoing (interventions implemented as appropriate)     Problem: Patient Care Overview  Goal: Interprofessional Rounds/Family Conf  Outcome: Ongoing (interventions implemented as appropriate)     Problem: Pain, Acute (Adult)  Goal: Acceptable Pain Control/Comfort Level  Outcome: Ongoing (interventions implemented as appropriate)     Problem: Pancreatitis, Acute/Chronic (Adult)  Goal: Signs and Symptoms of Listed Potential Problems Will be Absent, Minimized or Managed (Pancreatitis, Acute/Chronic)  Outcome: Ongoing (interventions implemented as appropriate)

## 2020-06-16 NOTE — PROGRESS NOTES
[facility]  INPATIENT PROGRESS NOTE    Length of Stay: 1  Date of Admission: 6/15/2020  Primary Care Physician: Provider, No Known    Subjective   Chief Complaint: Abdominal pain, vomiting  HPI:  84 year old male with a history of HTN who presented with abdominal pain and vomiting for about 3ad.  CT in outside hospital revealed pericholecystic fluid.  Laboratory evaluation noted elevated pancreatic enzymes.  RUQ ultrasound noted no cholelithiasis.  MRCP could not be performed due to pacemaker.  His pain is better but still present.  He denies nausea or vomiting.    Review of Systems   Constitutional: Negative for chills and fever.   Respiratory: Negative for shortness of breath.    Cardiovascular: Negative for chest pain.   Gastrointestinal: Positive for abdominal pain. Negative for nausea and vomiting.        All pertinent negatives and positives are as above. All other systems have been reviewed and are negative unless otherwise stated.     Objective    Temp:  [96.6 °F (35.9 °C)-98.9 °F (37.2 °C)] 98 °F (36.7 °C)  Heart Rate:  [60-76] 60  Resp:  [18] 18  BP: (141-180)/(60-79) 166/64    Physical Exam   Constitutional: He is oriented to person, place, and time. He appears well-developed and well-nourished. No distress.   HENT:   Head: Normocephalic and atraumatic.   Eyes: Conjunctivae are normal.   Cardiovascular: Normal rate and regular rhythm.   Pulmonary/Chest: Effort normal and breath sounds normal. No respiratory distress.   Abdominal: Soft. Bowel sounds are normal. He exhibits no distension. There is tenderness (left sided).   Musculoskeletal: He exhibits no edema or deformity.   Neurological: He is alert and oriented to person, place, and time.   Skin: Skin is warm and dry. He is not diaphoretic. No erythema.   Vitals reviewed.          Results Review:  I have reviewed the labs, radiology results, and diagnostic studies.    Laboratory Data:   Results  from last 7 days   Lab Units 06/16/20  0626 06/15/20  0601   SODIUM mmol/L 140 136  138   POTASSIUM mmol/L 3.9 4.4  4.5   CHLORIDE mmol/L 106 105  106   CO2 mmol/L 19.0* 22.0  23.0   BUN mg/dL 31* 25*  25*   CREATININE mg/dL 1.38* 1.42*  1.46*   GLUCOSE mg/dL 79 129*  130*   CALCIUM mg/dL 8.8 8.8  8.9   BILIRUBIN mg/dL 4.8* 4.4*  4.5*   ALK PHOS U/L 148* 155*  160*   ALT (SGPT) U/L 61* 87*  87*   AST (SGOT) U/L 77* 141*  144*   ANION GAP mmol/L 15.0 9.0  9.0     Estimated Creatinine Clearance: 38.3 mL/min (A) (by C-G formula based on SCr of 1.38 mg/dL (H)).  Results from last 7 days   Lab Units 06/15/20  0601   MAGNESIUM mg/dL 1.8   PHOSPHORUS mg/dL 3.2         Results from last 7 days   Lab Units 06/16/20  0626 06/15/20  0601   WBC 10*3/mm3 8.59 17.50*  17.00*   HEMOGLOBIN g/dL 11.3* 11.8*  11.9*   HEMATOCRIT % 34.2* 34.0*  34.2*   PLATELETS 10*3/mm3 120* 137*  138*           Culture Data:   No results found for: BLOODCX  No results found for: URINECX  No results found for: RESPCX  No results found for: WOUNDCX  No results found for: STOOLCX  No components found for: BODYFLD    Radiology Data:   Imaging Results (Last 24 Hours)     ** No results found for the last 24 hours. **          I have reviewed the patient's current medications.     Assessment/Plan     Active Hospital Problems    Diagnosis   • Acute pancreatitis       Plan:    NPO  IV fluid:  ml/hr  Zosyn  Blood cultures  HIDA scan today  GI consultation appreciated, plan discussed  Pain control: PRN dilaudid  Antiemetics: PRN Zofran  VTE PPx: SCD      The patient was evaluated during the global COVID-19 pandemic, and the diagnosis was suspected/considered upon their initial presentation.  Evaluation, treatment, and testing were consistent with current guidelines for patients who present with complaints or symptoms that may be related to COVID-19.        This document has been electronically signed by APPLE Kaiser on  June 16, 2020 12:10

## 2020-06-16 NOTE — PROGRESS NOTES
SUBJECTIVE:   6/16/2020  Chief Complaint:     Subjective      Patient feels better today.  Abdominal pain is improving.  No further nausea or vomiting.  Leukocytosis have resolved.  LFTs are improving.  Bili 4.8.  Lipase decreased to 497.    History:  No past medical history on file.  No past surgical history on file.  No family history on file.  Social History     Tobacco Use   • Smoking status: Former Smoker     Packs/day: 1.50     Years: 52.00     Pack years: 78.00     Types: Cigarettes   • Smokeless tobacco: Former User     Types: Chew   Substance Use Topics   • Alcohol use: Not on file   • Drug use: Not on file     Medications Prior to Admission   Medication Sig Dispense Refill Last Dose   • furosemide (LASIX) 20 MG tablet Take 20 mg by mouth Daily As Needed. 1/2 tablet prn for leg swelling      • metoprolol tartrate (LOPRESSOR) 100 MG tablet Take 100 mg by mouth 2 (Two) Times a Day.      • vitamin B-12 (CYANOCOBALAMIN) 500 MCG tablet Take 500 mcg by mouth Daily.        Allergies:  Patient has no known allergies.     CURRENT MEDICATIONS/OBJECTIVE/VS/PE:     Current Medications:     Current Facility-Administered Medications   Medication Dose Route Frequency Provider Last Rate Last Dose   • acetaminophen (TYLENOL) tablet 650 mg  650 mg Oral Q4H PRN Vincent Ambrose MD        Or   • acetaminophen (TYLENOL) 160 MG/5ML solution 650 mg  650 mg Oral Q4H PRN Vincent Ambrose MD        Or   • acetaminophen (TYLENOL) suppository 650 mg  650 mg Rectal Q4H PRN Vincent Ambrose MD       • bisacodyl (DULCOLAX) EC tablet 5 mg  5 mg Oral Daily PRN Vincent Ambrose MD       • bisacodyl (DULCOLAX) suppository 10 mg  10 mg Rectal Daily PRN Vincent Ambrose MD       • calcium carbonate (TUMS) chewable tablet 500 mg (200 mg elemental)  2 tablet Oral BID PRN Vincent Ambrose MD       • famotidine (PEPCID) tablet 40 mg  40 mg Oral Daily Vincent Ambrose MD       • HYDROmorphone (DILAUDID) injection 0.5 mg  0.5 mg Intravenous Q4H PRN  Vincent Ambrose MD       • magnesium hydroxide (MILK OF MAGNESIA) suspension 2400 mg/10mL 10 mL  10 mL Oral Daily PRN Vincent Ambrose MD       • melatonin tablet 5.25 mg  5.25 mg Oral Nightly PRN Vincent Ambrose MD       • ondansetron (ZOFRAN) tablet 4 mg  4 mg Oral Q6H PRN Vincent Ambrose MD        Or   • ondansetron (ZOFRAN) injection 4 mg  4 mg Intravenous Q6H PRN Vincent Ambrose MD       • Pharmacy to Dose Zosyn   Does not apply Continuous PRN Vincent Ambrose MD       • piperacillin-tazobactam (ZOSYN) 3.375 g/100 mL 0.9% NS IVPB (mbp)  3.375 g Intravenous Q8H Vincent Ambrose MD   3.375 g at 06/16/20 1428   • sodium chloride 0.9 % flush 10 mL  10 mL Intravenous Q12H Vincent Ambrose MD   10 mL at 06/15/20 2023   • sodium chloride 0.9 % flush 10 mL  10 mL Intravenous PRN Vincent Ambrose MD       • sodium chloride 0.9 % infusion  125 mL/hr Intravenous Continuous Chris Rojo APRN 125 mL/hr at 06/16/20 0215 125 mL/hr at 06/16/20 0215       Objective     Review of Systems:   Review of Systems   Constitutional: Negative for chills, fatigue, fever and unexpected weight change.   HENT: Negative for congestion, ear discharge, hearing loss, nosebleeds and sore throat.    Eyes: Negative for pain, discharge and redness.   Respiratory: Negative for cough, chest tightness, shortness of breath and wheezing.    Cardiovascular: Negative for chest pain and palpitations.   Gastrointestinal: Positive for abdominal pain. Negative for abdominal distention, blood in stool, constipation, diarrhea, nausea and vomiting.   Endocrine: Negative for cold intolerance, polydipsia, polyphagia and polyuria.   Genitourinary: Negative for dysuria, flank pain, frequency, hematuria and urgency.   Musculoskeletal: Negative for arthralgias, back pain, joint swelling and myalgias.   Skin: Negative for color change, pallor and rash.   Neurological: Negative for tremors, seizures, syncope, weakness and headaches.   Hematological: Negative for  adenopathy. Does not bruise/bleed easily.   Psychiatric/Behavioral: Negative for behavioral problems, confusion, dysphoric mood, hallucinations and suicidal ideas. The patient is not nervous/anxious.        Physical Exam:   Temp:  [98 °F (36.7 °C)-98.9 °F (37.2 °C)] 98 °F (36.7 °C)  Heart Rate:  [60-76] 60  Resp:  [18] 18  BP: (156-180)/(60-79) 166/64     Physical Exam:  General Appearance:    Alert, cooperative, in no acute distress   Head:    Normocephalic, without obvious abnormality, atraumatic   Eyes:            Lids and lashes normal, conjunctivae and sclerae normal, no   icterus, no pallor, corneas clear, PERRLA   Ears:    Ears appear intact with no abnormalities noted   Throat:   No oral lesions, no thrush, oral mucosa moist   Neck:   No adenopathy, supple, trachea midline, no thyromegaly, no     carotid bruit, no JVD   Back:     No kyphosis present, no scoliosis present, no skin lesions,       erythema or scars, no tenderness to percussion or                   palpation,   range of motion normal   Lungs:     Clear to auscultation,respirations regular, even and                   unlabored    Heart:    Regular rhythm and normal rate, normal S1 and S2, no            murmur, no gallop, no rub, no click   Breast Exam:    Deferred   Abdomen:     Normal bowel sounds, no masses, no organomegaly, soft        non-tender, non-distended, no guarding, no rebound                 tenderness   Genitalia:    Deferred   Extremities:   Moves all extremities well, no edema, no cyanosis, no              redness   Pulses:   Pulses palpable and equal bilaterally   Skin:   No bleeding, bruising or rash   Lymph nodes:   No palpable adenopathy   Neurologic:   Cranial nerves 2 - 12 grossly intact, sensation intact, DTR        present and equal bilaterally      Results Review:     Lab Results (last 24 hours)     Procedure Component Value Units Date/Time    Blood Culture - Blood, Arm, Right [[id number]] Collected:  06/15/20 8528     Specimen:  Blood from Arm, Right Updated:  06/16/20 1330     Blood Culture No growth at 24 hours    Lipase [[id number]]  (Abnormal) Collected:  06/16/20 0626    Specimen:  Blood Updated:  06/16/20 0714     Lipase 497 U/L     Comprehensive Metabolic Panel [[id number]]  (Abnormal) Collected:  06/16/20 0626    Specimen:  Blood Updated:  06/16/20 0706     Glucose 79 mg/dL      BUN 31 mg/dL      Creatinine 1.38 mg/dL      Sodium 140 mmol/L      Potassium 3.9 mmol/L      Chloride 106 mmol/L      CO2 19.0 mmol/L      Calcium 8.8 mg/dL      Total Protein 6.2 g/dL      Albumin 3.30 g/dL      ALT (SGPT) 61 U/L      AST (SGOT) 77 U/L      Alkaline Phosphatase 148 U/L      Total Bilirubin 4.8 mg/dL      eGFR Non African Amer 49 mL/min/1.73      Globulin 2.9 gm/dL      A/G Ratio 1.1 g/dL      BUN/Creatinine Ratio 22.5     Anion Gap 15.0 mmol/L     Narrative:       GFR Normal >60  Chronic Kidney Disease <60  Kidney Failure <15      CBC & Differential [[id number]] Collected:  06/16/20 0626    Specimen:  Blood Updated:  06/16/20 0644    Narrative:       The following orders were created for panel order CBC & Differential.  Procedure                               Abnormality         Status                     ---------                               -----------         ------                     CBC Auto Differential[[id number]]        Abnormal            Final result                 Please view results for these tests on the individual orders.    CBC Auto Differential [[id number]]  (Abnormal) Collected:  06/16/20 0626    Specimen:  Blood Updated:  06/16/20 0644     WBC 8.59 10*3/mm3      RBC 3.76 10*6/mm3      Hemoglobin 11.3 g/dL      Hematocrit 34.2 %      MCV 91.0 fL      MCH 30.1 pg      MCHC 33.0 g/dL      RDW 12.9 %      RDW-SD 42.0 fl      MPV 10.7 fL      Platelets 120 10*3/mm3      Neutrophil % 84.4 %      Lymphocyte % 7.8 %      Monocyte % 6.8 %      Eosinophil % 0.5 %      Basophil % 0.2 %      Immature Grans % 0.3 %       Neutrophils, Absolute 7.25 10*3/mm3      Lymphocytes, Absolute 0.67 10*3/mm3      Monocytes, Absolute 0.58 10*3/mm3      Eosinophils, Absolute 0.04 10*3/mm3      Basophils, Absolute 0.02 10*3/mm3      Immature Grans, Absolute 0.03 10*3/mm3      nRBC 0.0 /100 WBC     Blood Culture - Blood, Arm, Right [[id number]] Collected:  06/15/20 1511    Specimen:  Blood from Arm, Right Updated:  06/15/20 1516           I reviewed the patient's new clinical results.  I reviewed the patient's new imaging results and agree with the interpretation.     ASSESSMENT/PLAN:   ASSESSMENT: 1.  Acute pancreatitis, resolving.  2.  Elevated liver enzymes, improving.  Likely due to CBD stone passage.  Common bile duct appears normal on the ultrasound.  Patient unable to have MRCP due to the presence of pacemaker.  3.  Leukocytosis, resolved.    PLAN: 1.  Obtain HIDA scan for further evaluation of gallbladder.  2.  Follow LFTs closely.  3.  Initiate low-fat clear liquid diet if HIDA scan is unremarkable.  The risks, benefits, and alternatives of this procedure have been discussed with the patient or the responsible party- the patient understands and agrees to proceed.         Hipolito Stringer MD  06/16/20  15:00

## 2020-06-17 ENCOUNTER — PREP FOR SURGERY (OUTPATIENT)
Dept: OTHER | Facility: HOSPITAL | Age: 85
End: 2020-06-17

## 2020-06-17 DIAGNOSIS — K81.9 CHOLECYSTITIS: Primary | ICD-10-CM

## 2020-06-17 PROBLEM — I10 ESSENTIAL HYPERTENSION: Status: ACTIVE | Noted: 2020-06-17

## 2020-06-17 PROBLEM — K81.0 ACUTE CHOLECYSTITIS: Status: ACTIVE | Noted: 2020-06-17

## 2020-06-17 LAB
ALBUMIN SERPL-MCNC: 3.1 G/DL (ref 3.5–5.2)
ALBUMIN/GLOB SERPL: 1 G/DL
ALP SERPL-CCNC: 170 U/L (ref 39–117)
ALT SERPL W P-5'-P-CCNC: 57 U/L (ref 1–41)
ANION GAP SERPL CALCULATED.3IONS-SCNC: 14 MMOL/L (ref 5–15)
AST SERPL-CCNC: 71 U/L (ref 1–40)
BASOPHILS # BLD AUTO: 0.01 10*3/MM3 (ref 0–0.2)
BASOPHILS NFR BLD AUTO: 0.2 % (ref 0–1.5)
BILIRUB SERPL-MCNC: 3.5 MG/DL (ref 0.2–1.2)
BUN BLD-MCNC: 27 MG/DL (ref 8–23)
BUN/CREAT SERPL: 22.3 (ref 7–25)
CALCIUM SPEC-SCNC: 8.7 MG/DL (ref 8.6–10.5)
CHLORIDE SERPL-SCNC: 110 MMOL/L (ref 98–107)
CO2 SERPL-SCNC: 20 MMOL/L (ref 22–29)
CREAT BLD-MCNC: 1.21 MG/DL (ref 0.76–1.27)
DEPRECATED RDW RBC AUTO: 42.1 FL (ref 37–54)
EOSINOPHIL # BLD AUTO: 0.14 10*3/MM3 (ref 0–0.4)
EOSINOPHIL NFR BLD AUTO: 2.6 % (ref 0.3–6.2)
ERYTHROCYTE [DISTWIDTH] IN BLOOD BY AUTOMATED COUNT: 12.8 % (ref 12.3–15.4)
GFR SERPL CREATININE-BSD FRML MDRD: 57 ML/MIN/1.73
GLOBULIN UR ELPH-MCNC: 3.2 GM/DL
GLUCOSE BLD-MCNC: 104 MG/DL (ref 65–99)
HCT VFR BLD AUTO: 33.6 % (ref 37.5–51)
HGB BLD-MCNC: 11.3 G/DL (ref 13–17.7)
IMM GRANULOCYTES # BLD AUTO: 0.02 10*3/MM3 (ref 0–0.05)
IMM GRANULOCYTES NFR BLD AUTO: 0.4 % (ref 0–0.5)
LIPASE SERPL-CCNC: 497 U/L (ref 13–60)
LYMPHOCYTES # BLD AUTO: 0.45 10*3/MM3 (ref 0.7–3.1)
LYMPHOCYTES NFR BLD AUTO: 8.2 % (ref 19.6–45.3)
MCH RBC QN AUTO: 30.2 PG (ref 26.6–33)
MCHC RBC AUTO-ENTMCNC: 33.6 G/DL (ref 31.5–35.7)
MCV RBC AUTO: 89.8 FL (ref 79–97)
MONOCYTES # BLD AUTO: 0.49 10*3/MM3 (ref 0.1–0.9)
MONOCYTES NFR BLD AUTO: 8.9 % (ref 5–12)
NEUTROPHILS # BLD AUTO: 4.37 10*3/MM3 (ref 1.7–7)
NEUTROPHILS NFR BLD AUTO: 79.7 % (ref 42.7–76)
NRBC BLD AUTO-RTO: 0 /100 WBC (ref 0–0.2)
PLATELET # BLD AUTO: 123 10*3/MM3 (ref 140–450)
PMV BLD AUTO: 10.8 FL (ref 6–12)
POTASSIUM BLD-SCNC: 4.1 MMOL/L (ref 3.5–5.2)
PROT SERPL-MCNC: 6.3 G/DL (ref 6–8.5)
RBC # BLD AUTO: 3.74 10*6/MM3 (ref 4.14–5.8)
SARS-COV-2 N GENE RESP QL NAA+PROBE: NOT DETECTED
SODIUM BLD-SCNC: 144 MMOL/L (ref 136–145)
WBC NRBC COR # BLD: 5.48 10*3/MM3 (ref 3.4–10.8)

## 2020-06-17 PROCEDURE — 85025 COMPLETE CBC W/AUTO DIFF WBC: CPT | Performed by: NURSE PRACTITIONER

## 2020-06-17 PROCEDURE — 80053 COMPREHEN METABOLIC PANEL: CPT | Performed by: NURSE PRACTITIONER

## 2020-06-17 PROCEDURE — 99232 SBSQ HOSP IP/OBS MODERATE 35: CPT | Performed by: INTERNAL MEDICINE

## 2020-06-17 PROCEDURE — 25010000002 PIPERACILLIN SOD-TAZOBACTAM PER 1 G: Performed by: INTERNAL MEDICINE

## 2020-06-17 PROCEDURE — 25010000002 HYDRALAZINE PER 20 MG: Performed by: NURSE PRACTITIONER

## 2020-06-17 PROCEDURE — 83690 ASSAY OF LIPASE: CPT | Performed by: NURSE PRACTITIONER

## 2020-06-17 PROCEDURE — 87635 SARS-COV-2 COVID-19 AMP PRB: CPT | Performed by: SURGERY

## 2020-06-17 RX ORDER — HYDRALAZINE HYDROCHLORIDE 20 MG/ML
10 INJECTION INTRAMUSCULAR; INTRAVENOUS EVERY 6 HOURS PRN
Status: DISCONTINUED | OUTPATIENT
Start: 2020-06-17 | End: 2020-06-19 | Stop reason: HOSPADM

## 2020-06-17 RX ORDER — METOPROLOL TARTRATE 50 MG/1
100 TABLET, FILM COATED ORAL EVERY 12 HOURS SCHEDULED
Status: DISCONTINUED | OUTPATIENT
Start: 2020-06-17 | End: 2020-06-19 | Stop reason: HOSPADM

## 2020-06-17 RX ORDER — SODIUM CHLORIDE 0.9 % (FLUSH) 0.9 %
10 SYRINGE (ML) INJECTION AS NEEDED
Status: CANCELLED | OUTPATIENT
Start: 2020-06-17

## 2020-06-17 RX ORDER — SODIUM CHLORIDE 0.9 % (FLUSH) 0.9 %
3 SYRINGE (ML) INJECTION EVERY 12 HOURS SCHEDULED
Status: CANCELLED | OUTPATIENT
Start: 2020-06-17

## 2020-06-17 RX ADMIN — PIPERACILLIN SODIUM AND TAZOBACTAM SODIUM 3.38 G: 3; .375 INJECTION, POWDER, LYOPHILIZED, FOR SOLUTION INTRAVENOUS at 11:08

## 2020-06-17 RX ADMIN — METOPROLOL TARTRATE 100 MG: 50 TABLET, FILM COATED ORAL at 05:36

## 2020-06-17 RX ADMIN — FAMOTIDINE 40 MG: 40 TABLET, FILM COATED ORAL at 08:58

## 2020-06-17 RX ADMIN — LABETALOL HYDROCHLORIDE 20 MG: 5 INJECTION INTRAVENOUS at 00:15

## 2020-06-17 RX ADMIN — METOPROLOL TARTRATE 100 MG: 50 TABLET, FILM COATED ORAL at 21:01

## 2020-06-17 RX ADMIN — HYDRALAZINE HYDROCHLORIDE 10 MG: 20 INJECTION INTRAMUSCULAR; INTRAVENOUS at 20:00

## 2020-06-17 RX ADMIN — SODIUM CHLORIDE 100 ML/HR: 900 INJECTION, SOLUTION INTRAVENOUS at 17:30

## 2020-06-17 RX ADMIN — PIPERACILLIN SODIUM AND TAZOBACTAM SODIUM 3.38 G: 3; .375 INJECTION, POWDER, LYOPHILIZED, FOR SOLUTION INTRAVENOUS at 03:10

## 2020-06-17 RX ADMIN — HYDRALAZINE HYDROCHLORIDE 10 MG: 20 INJECTION INTRAMUSCULAR; INTRAVENOUS at 12:32

## 2020-06-17 RX ADMIN — SODIUM CHLORIDE 125 ML/HR: 900 INJECTION, SOLUTION INTRAVENOUS at 01:41

## 2020-06-17 RX ADMIN — LABETALOL HYDROCHLORIDE 20 MG: 5 INJECTION INTRAVENOUS at 07:33

## 2020-06-17 RX ADMIN — PIPERACILLIN SODIUM AND TAZOBACTAM SODIUM 3.38 G: 3; .375 INJECTION, POWDER, LYOPHILIZED, FOR SOLUTION INTRAVENOUS at 20:04

## 2020-06-17 RX ADMIN — SODIUM CHLORIDE, PRESERVATIVE FREE 10 ML: 5 INJECTION INTRAVENOUS at 08:59

## 2020-06-17 NOTE — PROGRESS NOTES
SUBJECTIVE:   6/17/2020  Chief Complaint:     Subjective      Patient feels some better today.  Has upper abdominal pain. No further nausea or vomiting.  Leukocytosis have resolved.  LFTs are improving.  Bili decreased to 3.5.  Lipase decreased to 497.  HIDA scan was consistent with patent CBD and nonvisualization of the gallbladder consistent with acute cholecystitis.    History:  No past medical history on file.  No past surgical history on file.  No family history on file.  Social History     Tobacco Use   • Smoking status: Former Smoker     Packs/day: 1.50     Years: 52.00     Pack years: 78.00     Types: Cigarettes   • Smokeless tobacco: Former User     Types: Chew   Substance Use Topics   • Alcohol use: Not on file   • Drug use: Not on file     Medications Prior to Admission   Medication Sig Dispense Refill Last Dose   • furosemide (LASIX) 20 MG tablet Take 20 mg by mouth Daily As Needed. 1/2 tablet prn for leg swelling      • metoprolol tartrate (LOPRESSOR) 100 MG tablet Take 100 mg by mouth 2 (Two) Times a Day.      • vitamin B-12 (CYANOCOBALAMIN) 500 MCG tablet Take 500 mcg by mouth Daily.        Allergies:  Patient has no known allergies.     CURRENT MEDICATIONS/OBJECTIVE/VS/PE:     Current Medications:     Current Facility-Administered Medications   Medication Dose Route Frequency Provider Last Rate Last Dose   • acetaminophen (TYLENOL) tablet 650 mg  650 mg Oral Q4H PRN Vincent Ambrose MD        Or   • acetaminophen (TYLENOL) 160 MG/5ML solution 650 mg  650 mg Oral Q4H PRN Vincent Ambrose MD        Or   • acetaminophen (TYLENOL) suppository 650 mg  650 mg Rectal Q4H PRN Vincent Ambrose MD       • bisacodyl (DULCOLAX) EC tablet 5 mg  5 mg Oral Daily PRN Vincent Ambrose MD       • bisacodyl (DULCOLAX) suppository 10 mg  10 mg Rectal Daily PRN Vincent Ambrose MD       • calcium carbonate (TUMS) chewable tablet 500 mg (200 mg elemental)  2 tablet Oral BID PRN Vincent Ambrose MD       • famotidine  (PEPCID) tablet 40 mg  40 mg Oral Daily Vincent Ambrose MD   40 mg at 06/17/20 0858   • hydrALAZINE (APRESOLINE) injection 10 mg  10 mg Intravenous Q6H PRN Chris Rojo APRN   10 mg at 06/17/20 1232   • HYDROmorphone (DILAUDID) injection 0.5 mg  0.5 mg Intravenous Q4H PRN Vincent Ambrose MD       • magnesium hydroxide (MILK OF MAGNESIA) suspension 2400 mg/10mL 10 mL  10 mL Oral Daily PRN Vincent Ambrose MD       • melatonin tablet 5.25 mg  5.25 mg Oral Nightly PRN Vincent Ambrose MD       • metoprolol tartrate (LOPRESSOR) tablet 100 mg  100 mg Oral Q12H Nabor Robledo MD   100 mg at 06/17/20 0536   • ondansetron (ZOFRAN) tablet 4 mg  4 mg Oral Q6H PRN Vincent Ambrose MD        Or   • ondansetron (ZOFRAN) injection 4 mg  4 mg Intravenous Q6H PRN Vincent Ambrose MD       • Pharmacy to Dose Zosyn   Does not apply Continuous PRN Vincent Ambrose MD       • piperacillin-tazobactam (ZOSYN) 3.375 g/100 mL 0.9% NS IVPB (mbp)  3.375 g Intravenous Q8H Vincent Ambrose MD   3.375 g at 06/17/20 1108   • sodium chloride 0.9 % flush 10 mL  10 mL Intravenous Q12H iVncent Ambrose MD   10 mL at 06/17/20 0859   • sodium chloride 0.9 % flush 10 mL  10 mL Intravenous PRN Vincent Ambrose MD       • sodium chloride 0.9 % infusion  100 mL/hr Intravenous Continuous Chris Rojo APRN 100 mL/hr at 06/17/20 1224 100 mL/hr at 06/17/20 1224       Objective     Review of Systems:   Review of Systems   Constitutional: Negative for chills, fatigue, fever and unexpected weight change.   HENT: Negative for congestion, ear discharge, hearing loss, nosebleeds and sore throat.    Eyes: Negative for pain, discharge and redness.   Respiratory: Negative for cough, chest tightness, shortness of breath and wheezing.    Cardiovascular: Negative for chest pain and palpitations.   Gastrointestinal: Positive for abdominal pain. Negative for abdominal distention, blood in stool, constipation, diarrhea, nausea and vomiting.    Endocrine: Negative for cold intolerance, polydipsia, polyphagia and polyuria.   Genitourinary: Negative for dysuria, flank pain, frequency, hematuria and urgency.   Musculoskeletal: Negative for arthralgias, back pain, joint swelling and myalgias.   Skin: Negative for color change, pallor and rash.   Neurological: Negative for tremors, seizures, syncope, weakness and headaches.   Hematological: Negative for adenopathy. Does not bruise/bleed easily.   Psychiatric/Behavioral: Negative for behavioral problems, confusion, dysphoric mood, hallucinations and suicidal ideas. The patient is not nervous/anxious.        Physical Exam:   Temp:  [96.9 °F (36.1 °C)-98.4 °F (36.9 °C)] 97.7 °F (36.5 °C)  Heart Rate:  [61-78] 61  Resp:  [18] 18  BP: (158-212)/(52-89) 170/88     Physical Exam:  General Appearance:    Alert, cooperative, in no acute distress   Head:    Normocephalic, without obvious abnormality, atraumatic   Eyes:            Lids and lashes normal, conjunctivae and sclerae normal, no   icterus, no pallor, corneas clear, PERRLA   Ears:    Ears appear intact with no abnormalities noted   Throat:   No oral lesions, no thrush, oral mucosa moist   Neck:   No adenopathy, supple, trachea midline, no thyromegaly, no     carotid bruit, no JVD   Back:     No kyphosis present, no scoliosis present, no skin lesions,       erythema or scars, no tenderness to percussion or                   palpation,   range of motion normal   Lungs:     Clear to auscultation,respirations regular, even and                   unlabored    Heart:    Regular rhythm and normal rate, normal S1 and S2, no            murmur, no gallop, no rub, no click   Breast Exam:    Deferred   Abdomen:     Normal bowel sounds, no masses, no organomegaly, soft        non-tender, non-distended, no guarding, no rebound                 tenderness   Genitalia:    Deferred   Extremities:   Moves all extremities well, no edema, no cyanosis, no              redness    Pulses:   Pulses palpable and equal bilaterally   Skin:   No bleeding, bruising or rash   Lymph nodes:   No palpable adenopathy   Neurologic:   Cranial nerves 2 - 12 grossly intact, sensation intact, DTR        present and equal bilaterally      Results Review:     Lab Results (last 24 hours)     Procedure Component Value Units Date/Time    Blood Culture - Blood, Arm, Right [[id number]] Collected:  06/15/20 1318    Specimen:  Blood from Arm, Right Updated:  06/17/20 1330     Blood Culture No growth at 2 days    Lipase [[id number]]  (Abnormal) Collected:  06/17/20 0616    Specimen:  Blood Updated:  06/17/20 0653     Lipase 497 U/L     Comprehensive Metabolic Panel [[id number]]  (Abnormal) Collected:  06/17/20 0616    Specimen:  Blood Updated:  06/17/20 0645     Glucose 104 mg/dL      BUN 27 mg/dL      Creatinine 1.21 mg/dL      Sodium 144 mmol/L      Potassium 4.1 mmol/L      Chloride 110 mmol/L      CO2 20.0 mmol/L      Calcium 8.7 mg/dL      Total Protein 6.3 g/dL      Albumin 3.10 g/dL      ALT (SGPT) 57 U/L      AST (SGOT) 71 U/L      Comment: Specimen hemolyzed.  Results may be affected.        Alkaline Phosphatase 170 U/L      Total Bilirubin 3.5 mg/dL      eGFR Non African Amer 57 mL/min/1.73      Globulin 3.2 gm/dL      A/G Ratio 1.0 g/dL      BUN/Creatinine Ratio 22.3     Anion Gap 14.0 mmol/L     Narrative:       GFR Normal >60  Chronic Kidney Disease <60  Kidney Failure <15      CBC & Differential [[id number]] Collected:  06/17/20 0616    Specimen:  Blood Updated:  06/17/20 0627    Narrative:       The following orders were created for panel order CBC & Differential.  Procedure                               Abnormality         Status                     ---------                               -----------         ------                     CBC Auto Differential[[id number]]        Abnormal            Final result                 Please view results for these tests on the individual orders.    CBC Auto  Differential [[id number]]  (Abnormal) Collected:  06/17/20 0616    Specimen:  Blood Updated:  06/17/20 0627     WBC 5.48 10*3/mm3      RBC 3.74 10*6/mm3      Hemoglobin 11.3 g/dL      Hematocrit 33.6 %      MCV 89.8 fL      MCH 30.2 pg      MCHC 33.6 g/dL      RDW 12.8 %      RDW-SD 42.1 fl      MPV 10.8 fL      Platelets 123 10*3/mm3      Neutrophil % 79.7 %      Lymphocyte % 8.2 %      Monocyte % 8.9 %      Eosinophil % 2.6 %      Basophil % 0.2 %      Immature Grans % 0.4 %      Neutrophils, Absolute 4.37 10*3/mm3      Lymphocytes, Absolute 0.45 10*3/mm3      Monocytes, Absolute 0.49 10*3/mm3      Eosinophils, Absolute 0.14 10*3/mm3      Basophils, Absolute 0.01 10*3/mm3      Immature Grans, Absolute 0.02 10*3/mm3      nRBC 0.0 /100 WBC     Blood Culture - Blood, Arm, Right [[id number]] Collected:  06/15/20 1511    Specimen:  Blood from Arm, Right Updated:  06/16/20 1530     Blood Culture No growth at 24 hours           I reviewed the patient's new clinical results.  I reviewed the patient's new imaging results and agree with the interpretation.     ASSESSMENT/PLAN:   ASSESSMENT: 1.  Acute pancreatitis, resolving.  2.  Elevated liver enzymes, improving.  Likely due to CBD stone passage.  Common bile duct appears normal on the ultrasound.  Patient unable to have MRCP due to the presence of pacemaker.  3.  Leukocytosis, resolved.  4.  Acute cholecystitis    PLAN: 1.  Cholecystectomy per Dr. Miramontes  2.  Follow LFTs closely.  3.  Continue antibiotics and bowel rest  The risks, benefits, and alternatives of this procedure have been discussed with the patient or the responsible party- the patient understands and agrees to proceed.         Hipolito Stringer MD  06/17/20  13:48

## 2020-06-17 NOTE — CONSULTS
Referring Provider: Dr. Gavin      Patient Care Team:  Provider, No Known as PCP - General      Subjective .  Pancreatitis and possible cholecystitis    History of present illness:   84-year-old gentleman admitted in transfer from [facility] 2 days ago with pancreatitis.  Significantly elevated lipase and amylase with upper abdominal pain for 3 days.  Patient was transferred to our facility due to possibility of ERCP needed I would presume.  Patient was seen by Dr. 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  HIDA scan was done and it demonstrated flow into the duodenum to the common bile duct but gallbladder did not light up at 90 minutes to suggest cholecystitis.  Patient denies ever having any symptoms like this before.  His symptoms have completely resolved in the past 24 hours.  Ultrasound was done and demonstrated a distended gallbladder with gallbladder was normal thickness and there was no obvious gallstones and common bile duct was 4 mm.  Patient does drink wine at least a couple times a week sometimes daily with a small amount.  No obvious medications that can cause pancreatitis.  Patient's labs all improved include LFTs and amylase and lipase except for bilirubin which went up to over 4 yesterday but is down to under 4 this morning.  White count is been normal is been here.  Only abdominal surgery that he is ever had is been a appendectomy when he was a child.  He does not take any anticoagulants other than an aspirin a day    Review of Systems   Constitutional: Negative.    HENT: Negative for hearing loss, nosebleeds and trouble swallowing.    Respiratory: Negative for apnea, chest tightness and shortness of breath.    Cardiovascular: Negative for chest pain and palpitations.   Gastrointestinal: Negative for abdominal distention, abdominal pain, blood in stool, constipation, diarrhea, nausea and vomiting.    Genitourinary: Negative for difficulty urinating, dysuria, frequency and urgency.   Musculoskeletal: Negative for back pain, joint swelling and neck pain.   Skin: Negative for rash.   Neurological: Negative for dizziness, seizures, weakness, light-headedness, numbness and headaches.   Hematological: Negative for adenopathy.   Psychiatric/Behavioral: Negative for agitation. The patient is not nervous/anxious.          History  No past medical history on file., No past surgical history on file., No family history on file., Social History     Tobacco Use   • Smoking status: Former Smoker     Packs/day: 1.50     Years: 52.00     Pack years: 78.00     Types: Cigarettes   • Smokeless tobacco: Former User     Types: Chew   Substance Use Topics   • Alcohol use: Not on file   • Drug use: Not on file   , Home Medications:  Prior to Admission medications    Medication Sig Start Date End Date Taking? Authorizing Provider   furosemide (LASIX) 20 MG tablet Take 20 mg by mouth Daily As Needed. 1/2 tablet prn for leg swelling   Yes ProviderTheron MD   metoprolol tartrate (LOPRESSOR) 100 MG tablet Take 100 mg by mouth 2 (Two) Times a Day.   Yes ProviderTheron MD   vitamin B-12 (CYANOCOBALAMIN) 500 MCG tablet Take 500 mcg by mouth Daily.   Yes Provider, MD Theron   , Scheduled Meds:    famotidine 40 mg Oral Daily   metoprolol tartrate 100 mg Oral Q12H   piperacillin-tazobactam 3.375 g Intravenous Q8H   sodium chloride 10 mL Intravenous Q12H   , Continuous Infusions:    Pharmacy to Dose Zosyn     sodium chloride 125 mL/hr Last Rate: 125 mL/hr (06/17/20 0141)   , PRN Meds:  •  acetaminophen **OR** acetaminophen **OR** acetaminophen  •  bisacodyl  •  bisacodyl  •  calcium carbonate  •  HYDROmorphone  •  labetalol  •  magnesium hydroxide  •  melatonin  •  ondansetron **OR** ondansetron  •  Pharmacy to Dose Zosyn  •  sodium chloride and Allergies:  No Known Allergies    Objective     Vital Signs   Temp:  [96.9 °F  (36.1 °C)-98.4 °F (36.9 °C)] 97.2 °F (36.2 °C)  Heart Rate:  [64-78] 78  Resp:  [18] 18  BP: (158-198)/(52-70) 176/66    Physical Exam:     General Appearance:    Alert, cooperative, in no acute distress   Head:    Normocephalic, without obvious abnormality, atraumatic   Eyes:            Lids and lashes normal, conjunctivae and sclerae normal, mild icterus, no pallor, corneas clear   Ears:    Ears appear intact with no abnormalities noted   Throat:   No oral lesions, no thrush, oral mucosa moist   Neck:   No adenopathy, supple, trachea midline, no thyromegaly,   no JVD   Lungs:     Clear to auscultation,respirations regular, even and                  unlabored    Heart:    Regular rhythm and normal rate, normal S1 and S2, no            murmur, no gallop, no rub, no click   Chest Wall:    No abnormalities observed   Abdomen:     Normal bowel sounds, no masses, no organomegaly, soft        non-tender, non-distended, no guarding, no rebound                tenderness   Extremities:   Moves all extremities well, no edema, no cyanosis, no             redness   Skin:   No bleeding, bruising or rash   Lymph nodes:   No palpable adenopathy   Neurologic:  Grossly intact, sensation intact       Results Review:   I reviewed the patient's new clinical results.      Assessment/Plan       Acute pancreatitis  Possible cholecystitis.  No obvious stones to suggest biliary pancreatitis      I discussed the patients findings and my recommendations with patient and nursing staff     Discussed this fully with the patient.  HIDA scan clearly suggest possible functional issues with the gallbladder.  Pancreatitis may be related to another etiology than gallstones.  Patient can have clear liquids today we will continue to monitor him follow his bilirubin and other labs at this point.  Will attempt to get CT scan from [facility] for review.        This document has been electronically signed by Kirby Miramontes MD on June 17, 2020 09:19      Kirby Miramontes MD  06/17/20  09:19      Addendum  Unable to get CT films from [facility] but official report came back and pancreas was reported as normal per radiology reading.  I went over situation with the patient and recommend laparoscopic cholecystectomy and interoperative cholangiogram.  I fully discussed the procedure alternatives risk benefits with the patient he clearly understands wished to proceed.  He is aware that he may not have gallstones and there may be another etiology other than gallstones for his pancreatitis there could also be a small stone was not able to be seen and that could have been what caused his pancreatitis as well.  He clearly understands all this and wishes to proceed with surgery

## 2020-06-17 NOTE — PLAN OF CARE
Problem: Patient Care Overview  Goal: Plan of Care Review  Outcome: Ongoing (interventions implemented as appropriate)  Flowsheets (Taken 6/17/2020 1154)  Progress: improving  Plan of Care Reviewed With: patient  Outcome Summary: Patient is resting in bed. States he is not having any pain at this time. Dr. Miramontes stated patient can have clear liquids. BP is still elevated. Will continue to monitor.

## 2020-06-17 NOTE — PLAN OF CARE
Problem: Patient Care Overview  Goal: Plan of Care Review  Outcome: Ongoing (interventions implemented as appropriate)  Flowsheets  Taken 6/17/2020 0422  Progress: improving  Outcome Summary: BP elevated, PRN medication given. pt resting in between care. will continue to monitor.  Taken 6/16/2020 2018  Plan of Care Reviewed With: patient     Problem: Pancreatitis, Acute/Chronic (Adult)  Goal: Signs and Symptoms of Listed Potential Problems Will be Absent, Minimized or Managed (Pancreatitis, Acute/Chronic)  Outcome: Ongoing (interventions implemented as appropriate)  Flowsheets  Taken 6/16/2020 0015 by Radha Yarbrough, RN  Problems Assessed (Pancreatitis): all  Taken 6/17/2020 0422 by Susy Valentin RN  Problems Present (Pancreatitis): none

## 2020-06-17 NOTE — PROGRESS NOTES
[facility] Medicine Services  INPATIENT PROGRESS NOTE    Length of Stay: 2  Date of Admission: 6/15/2020  Primary Care Physician: Provider, No Known    Subjective   Chief Complaint: Abdominal pain, vomiting  HPI:  84 year old male with a history of HTN who presented with abdominal pain and vomiting for about 3ad.  CT in outside hospital revealed pericholecystic fluid.  Laboratory evaluation noted elevated pancreatic enzymes.  RUQ ultrasound noted no cholelithiasis.  MRCP could not be performed due to pacemaker.  HIDA scan was recommended and is consistent with acute cholecystitis.  General surgery was consulted. Lipase and bilirubin have improved.  He denies abdominal pain today.     Review of Systems   Constitutional: Negative for chills and fever.   Respiratory: Negative for shortness of breath.    Cardiovascular: Negative for chest pain.   Gastrointestinal: Negative for abdominal pain, nausea and vomiting.        All pertinent negatives and positives are as above. All other systems have been reviewed and are negative unless otherwise stated.     Objective    Temp:  [96.9 °F (36.1 °C)-98.4 °F (36.9 °C)] 97.7 °F (36.5 °C)  Heart Rate:  [61-78] 61  Resp:  [18] 18  BP: (158-212)/(52-89) 170/88    Physical Exam   Constitutional: He is oriented to person, place, and time. He appears well-developed and well-nourished. No distress.   HENT:   Head: Normocephalic and atraumatic.   Eyes: Conjunctivae are normal.   Cardiovascular: Normal rate and regular rhythm.   Pulmonary/Chest: Effort normal and breath sounds normal. No respiratory distress.   Abdominal: Soft. Bowel sounds are normal. He exhibits no distension. There is no tenderness.   Musculoskeletal: He exhibits no edema or deformity.   Neurological: He is alert and oriented to person, place, and time.   Skin: Skin is warm and dry. He is not diaphoretic. No erythema.   Vitals reviewed.          Results Review:  I have reviewed  the labs, radiology results, and diagnostic studies.    Laboratory Data:   Results from last 7 days   Lab Units 06/17/20  0616 06/16/20  0626 06/15/20  0601   SODIUM mmol/L 144 140 136  138   POTASSIUM mmol/L 4.1 3.9 4.4  4.5   CHLORIDE mmol/L 110* 106 105  106   CO2 mmol/L 20.0* 19.0* 22.0  23.0   BUN mg/dL 27* 31* 25*  25*   CREATININE mg/dL 1.21 1.38* 1.42*  1.46*   GLUCOSE mg/dL 104* 79 129*  130*   CALCIUM mg/dL 8.7 8.8 8.8  8.9   BILIRUBIN mg/dL 3.5* 4.8* 4.4*  4.5*   ALK PHOS U/L 170* 148* 155*  160*   ALT (SGPT) U/L 57* 61* 87*  87*   AST (SGOT) U/L 71* 77* 141*  144*   ANION GAP mmol/L 14.0 15.0 9.0  9.0     Estimated Creatinine Clearance: 43.8 mL/min (by C-G formula based on SCr of 1.21 mg/dL).  Results from last 7 days   Lab Units 06/15/20  0601   MAGNESIUM mg/dL 1.8   PHOSPHORUS mg/dL 3.2         Results from last 7 days   Lab Units 06/17/20  0616 06/16/20  0626 06/15/20  0601   WBC 10*3/mm3 5.48 8.59 17.50*  17.00*   HEMOGLOBIN g/dL 11.3* 11.3* 11.8*  11.9*   HEMATOCRIT % 33.6* 34.2* 34.0*  34.2*   PLATELETS 10*3/mm3 123* 120* 137*  138*           Culture Data:   Blood Culture   Date Value Ref Range Status   06/15/2020 No growth at 24 hours  Preliminary   06/15/2020 No growth at 24 hours  Preliminary     No results found for: URINECX  No results found for: RESPCX  No results found for: WOUNDCX  No results found for: STOOLCX  No components found for: BODYFLD    Radiology Data:   Imaging Results (Last 24 Hours)     Procedure Component Value Units Date/Time    NM Hepatobiliary Without CCK [[id number]] Collected:  06/16/20 1158     Updated:  06/16/20 1431    Narrative:       Nuclear medicine hepatobiliary scan.     CLINICAL HISTORY:   Right upper quadrant symptoms      COMPARISON EXAMINATIONS:    Ultrasound Chloé 15, 2020.     FINDINGS:      A Nuclear Medicine hepatobiliary imaging examination was  performed, following the administration of 6.5   millicuries of  Tc labeled Choletec and images  obtained as a dynamic acquisition.       There is prompt clearance of tracer from the blood pool into the  hepatic parenchyma, with progression of tracer into the  intrahepatic biliary radicals in an appropriate amount of time.    The gall bladder is not visualized in an appropriate period of  time. (90 minutes following injection).    There is progression of tracer through the common bile duct, and  into the small bowel in an appropriate period of time.      Impression:       1. Nonvisualization of the gallbladder up to 90 minutes following  injection of radionuclide. This suggests acute cholecystitis.    2.  Patent common bile duct.    Electronically signed by:  Roque Huang MD  6/16/2020 2:30 PM CDT  Workstation: [id number]          I have reviewed the patient's current medications.     Assessment/Plan     Active Hospital Problems    Diagnosis   • **Acute pancreatitis   • Acute cholecystitis       Plan:    Clear liquid diet  IV fluid:  ml/hr  Zosyn  General surgery consultation   GI consultation appreciated, plan discussed  BP control: Lopressor, PRN hydralazine  Pain control: PRN dilaudid  Antiemetics: PRN Zofran  VTE PPx: SCD      The patient was evaluated during the global COVID-19 pandemic, and the diagnosis was suspected/considered upon their initial presentation.  Evaluation, treatment, and testing were consistent with current guidelines for patients who present with complaints or symptoms that may be related to COVID-19.        This document has been electronically signed by APPLE Kaiser on June 17, 2020 12:23

## 2020-06-18 ENCOUNTER — ANESTHESIA (OUTPATIENT)
Dept: PERIOP | Facility: HOSPITAL | Age: 85
End: 2020-06-18

## 2020-06-18 ENCOUNTER — APPOINTMENT (OUTPATIENT)
Dept: GENERAL RADIOLOGY | Facility: HOSPITAL | Age: 85
End: 2020-06-18

## 2020-06-18 ENCOUNTER — ANESTHESIA EVENT (OUTPATIENT)
Dept: PERIOP | Facility: HOSPITAL | Age: 85
End: 2020-06-18

## 2020-06-18 PROBLEM — K81.9 CHOLECYSTITIS: Status: ACTIVE | Noted: 2020-06-14

## 2020-06-18 LAB
ALBUMIN SERPL-MCNC: 3 G/DL (ref 3.5–5.2)
ALBUMIN/GLOB SERPL: 1 G/DL
ALP SERPL-CCNC: 211 U/L (ref 39–117)
ALT SERPL W P-5'-P-CCNC: 52 U/L (ref 1–41)
ANION GAP SERPL CALCULATED.3IONS-SCNC: 9 MMOL/L (ref 5–15)
AST SERPL-CCNC: 68 U/L (ref 1–40)
BASOPHILS # BLD AUTO: 0.01 10*3/MM3 (ref 0–0.2)
BASOPHILS NFR BLD AUTO: 0.2 % (ref 0–1.5)
BILIRUB SERPL-MCNC: 2.3 MG/DL (ref 0.2–1.2)
BUN BLD-MCNC: 17 MG/DL (ref 8–23)
BUN/CREAT SERPL: 16 (ref 7–25)
CALCIUM SPEC-SCNC: 8.7 MG/DL (ref 8.6–10.5)
CHLORIDE SERPL-SCNC: 110 MMOL/L (ref 98–107)
CO2 SERPL-SCNC: 20 MMOL/L (ref 22–29)
CREAT BLD-MCNC: 1.06 MG/DL (ref 0.76–1.27)
DEPRECATED RDW RBC AUTO: 42 FL (ref 37–54)
EOSINOPHIL # BLD AUTO: 0.24 10*3/MM3 (ref 0–0.4)
EOSINOPHIL NFR BLD AUTO: 3.8 % (ref 0.3–6.2)
ERYTHROCYTE [DISTWIDTH] IN BLOOD BY AUTOMATED COUNT: 12.8 % (ref 12.3–15.4)
GFR SERPL CREATININE-BSD FRML MDRD: 67 ML/MIN/1.73
GLOBULIN UR ELPH-MCNC: 3.1 GM/DL
GLUCOSE BLD-MCNC: 105 MG/DL (ref 65–99)
HCT VFR BLD AUTO: 34.5 % (ref 37.5–51)
HGB BLD-MCNC: 11.9 G/DL (ref 13–17.7)
IMM GRANULOCYTES # BLD AUTO: 0.05 10*3/MM3 (ref 0–0.05)
IMM GRANULOCYTES NFR BLD AUTO: 0.8 % (ref 0–0.5)
LIPASE SERPL-CCNC: 409 U/L (ref 13–60)
LYMPHOCYTES # BLD AUTO: 1.14 10*3/MM3 (ref 0.7–3.1)
LYMPHOCYTES NFR BLD AUTO: 18 % (ref 19.6–45.3)
MCH RBC QN AUTO: 30.7 PG (ref 26.6–33)
MCHC RBC AUTO-ENTMCNC: 34.5 G/DL (ref 31.5–35.7)
MCV RBC AUTO: 88.9 FL (ref 79–97)
MONOCYTES # BLD AUTO: 0.61 10*3/MM3 (ref 0.1–0.9)
MONOCYTES NFR BLD AUTO: 9.6 % (ref 5–12)
NEUTROPHILS # BLD AUTO: 4.29 10*3/MM3 (ref 1.7–7)
NEUTROPHILS NFR BLD AUTO: 67.6 % (ref 42.7–76)
NRBC BLD AUTO-RTO: 0 /100 WBC (ref 0–0.2)
PLATELET # BLD AUTO: 147 10*3/MM3 (ref 140–450)
PMV BLD AUTO: 10.7 FL (ref 6–12)
POTASSIUM BLD-SCNC: 3.3 MMOL/L (ref 3.5–5.2)
PROT SERPL-MCNC: 6.1 G/DL (ref 6–8.5)
RBC # BLD AUTO: 3.88 10*6/MM3 (ref 4.14–5.8)
SODIUM BLD-SCNC: 139 MMOL/L (ref 136–145)
WBC NRBC COR # BLD: 6.34 10*3/MM3 (ref 3.4–10.8)

## 2020-06-18 PROCEDURE — 76000 FLUOROSCOPY <1 HR PHYS/QHP: CPT

## 2020-06-18 PROCEDURE — 25010000002 PIPERACILLIN SOD-TAZOBACTAM PER 1 G: Performed by: NURSE PRACTITIONER

## 2020-06-18 PROCEDURE — 85025 COMPLETE CBC W/AUTO DIFF WBC: CPT | Performed by: NURSE PRACTITIONER

## 2020-06-18 PROCEDURE — 25010000002 PROPOFOL 10 MG/ML EMULSION: Performed by: NURSE ANESTHETIST, CERTIFIED REGISTERED

## 2020-06-18 PROCEDURE — 25010000002 HYDROMORPHONE 1 MG/ML SOLUTION: Performed by: SURGERY

## 2020-06-18 PROCEDURE — 80053 COMPREHEN METABOLIC PANEL: CPT | Performed by: NURSE PRACTITIONER

## 2020-06-18 PROCEDURE — 25010000002 FENTANYL CITRATE (PF) 100 MCG/2ML SOLUTION: Performed by: NURSE ANESTHETIST, CERTIFIED REGISTERED

## 2020-06-18 PROCEDURE — BF101ZZ FLUOROSCOPY OF BILE DUCTS USING LOW OSMOLAR CONTRAST: ICD-10-PCS | Performed by: SURGERY

## 2020-06-18 PROCEDURE — 25010000002 DEXAMETHASONE PER 1 MG: Performed by: NURSE ANESTHETIST, CERTIFIED REGISTERED

## 2020-06-18 PROCEDURE — 25010000002 HYDRALAZINE PER 20 MG: Performed by: NURSE PRACTITIONER

## 2020-06-18 PROCEDURE — 99232 SBSQ HOSP IP/OBS MODERATE 35: CPT | Performed by: INTERNAL MEDICINE

## 2020-06-18 PROCEDURE — 25010000002 PIPERACILLIN SOD-TAZOBACTAM PER 1 G: Performed by: INTERNAL MEDICINE

## 2020-06-18 PROCEDURE — 83690 ASSAY OF LIPASE: CPT | Performed by: NURSE PRACTITIONER

## 2020-06-18 PROCEDURE — 93005 ELECTROCARDIOGRAM TRACING: CPT | Performed by: ANESTHESIOLOGY

## 2020-06-18 PROCEDURE — 74300 X-RAY BILE DUCTS/PANCREAS: CPT | Performed by: SURGERY

## 2020-06-18 PROCEDURE — 88304 TISSUE EXAM BY PATHOLOGIST: CPT

## 2020-06-18 PROCEDURE — 47563 LAPARO CHOLECYSTECTOMY/GRAPH: CPT | Performed by: SURGERY

## 2020-06-18 PROCEDURE — 0FT44ZZ RESECTION OF GALLBLADDER, PERCUTANEOUS ENDOSCOPIC APPROACH: ICD-10-PCS | Performed by: SURGERY

## 2020-06-18 PROCEDURE — 25010000002 ONDANSETRON PER 1 MG: Performed by: NURSE ANESTHETIST, CERTIFIED REGISTERED

## 2020-06-18 PROCEDURE — 93010 ELECTROCARDIOGRAM REPORT: CPT | Performed by: INTERNAL MEDICINE

## 2020-06-18 PROCEDURE — 25010000002 IOPAMIDOL 61 % SOLUTION: Performed by: SURGERY

## 2020-06-18 DEVICE — LIGAMAX 5 MM ENDOSCOPIC MULTIPLE CLIP APPLIER
Type: IMPLANTABLE DEVICE | Site: ABDOMEN | Status: FUNCTIONAL
Brand: LIGAMAX

## 2020-06-18 RX ORDER — SODIUM CHLORIDE 0.9 % (FLUSH) 0.9 %
10 SYRINGE (ML) INJECTION AS NEEDED
Status: DISCONTINUED | OUTPATIENT
Start: 2020-06-18 | End: 2020-06-18 | Stop reason: HOSPADM

## 2020-06-18 RX ORDER — LIDOCAINE HYDROCHLORIDE 20 MG/ML
INJECTION, SOLUTION INFILTRATION; PERINEURAL AS NEEDED
Status: DISCONTINUED | OUTPATIENT
Start: 2020-06-18 | End: 2020-06-18 | Stop reason: SURG

## 2020-06-18 RX ORDER — BUPIVACAINE HYDROCHLORIDE AND EPINEPHRINE 5; 5 MG/ML; UG/ML
INJECTION, SOLUTION EPIDURAL; INTRACAUDAL; PERINEURAL AS NEEDED
Status: DISCONTINUED | OUTPATIENT
Start: 2020-06-18 | End: 2020-06-18 | Stop reason: HOSPADM

## 2020-06-18 RX ORDER — ONDANSETRON 2 MG/ML
4 INJECTION INTRAMUSCULAR; INTRAVENOUS ONCE AS NEEDED
Status: DISCONTINUED | OUTPATIENT
Start: 2020-06-18 | End: 2020-06-18 | Stop reason: HOSPADM

## 2020-06-18 RX ORDER — DEXAMETHASONE SODIUM PHOSPHATE 4 MG/ML
INJECTION, SOLUTION INTRA-ARTICULAR; INTRALESIONAL; INTRAMUSCULAR; INTRAVENOUS; SOFT TISSUE AS NEEDED
Status: DISCONTINUED | OUTPATIENT
Start: 2020-06-18 | End: 2020-06-18 | Stop reason: SURG

## 2020-06-18 RX ORDER — ONDANSETRON 2 MG/ML
INJECTION INTRAMUSCULAR; INTRAVENOUS AS NEEDED
Status: DISCONTINUED | OUTPATIENT
Start: 2020-06-18 | End: 2020-06-18 | Stop reason: SURG

## 2020-06-18 RX ORDER — 0.9 % SODIUM CHLORIDE 0.9 %
VIAL (ML) INJECTION AS NEEDED
Status: DISCONTINUED | OUTPATIENT
Start: 2020-06-18 | End: 2020-06-18 | Stop reason: HOSPADM

## 2020-06-18 RX ORDER — POTASSIUM CHLORIDE 1.5 G/1.77G
40 POWDER, FOR SOLUTION ORAL AS NEEDED
Status: DISCONTINUED | OUTPATIENT
Start: 2020-06-18 | End: 2020-06-19 | Stop reason: HOSPADM

## 2020-06-18 RX ORDER — PROPOFOL 10 MG/ML
VIAL (ML) INTRAVENOUS AS NEEDED
Status: DISCONTINUED | OUTPATIENT
Start: 2020-06-18 | End: 2020-06-18 | Stop reason: SURG

## 2020-06-18 RX ORDER — HYDROCODONE BITARTRATE AND ACETAMINOPHEN 7.5; 325 MG/1; MG/1
1 TABLET ORAL EVERY 6 HOURS PRN
Status: DISCONTINUED | OUTPATIENT
Start: 2020-06-18 | End: 2020-06-19 | Stop reason: HOSPADM

## 2020-06-18 RX ORDER — FENTANYL CITRATE 50 UG/ML
INJECTION, SOLUTION INTRAMUSCULAR; INTRAVENOUS AS NEEDED
Status: DISCONTINUED | OUTPATIENT
Start: 2020-06-18 | End: 2020-06-18 | Stop reason: SURG

## 2020-06-18 RX ORDER — IPRATROPIUM BROMIDE AND ALBUTEROL SULFATE 2.5; .5 MG/3ML; MG/3ML
3 SOLUTION RESPIRATORY (INHALATION) ONCE
Status: COMPLETED | OUTPATIENT
Start: 2020-06-18 | End: 2020-06-18

## 2020-06-18 RX ORDER — HYDRALAZINE HYDROCHLORIDE 20 MG/ML
5 INJECTION INTRAMUSCULAR; INTRAVENOUS ONCE
Status: DISCONTINUED | OUTPATIENT
Start: 2020-06-18 | End: 2020-06-19 | Stop reason: HOSPADM

## 2020-06-18 RX ORDER — ROCURONIUM BROMIDE 10 MG/ML
INJECTION, SOLUTION INTRAVENOUS AS NEEDED
Status: DISCONTINUED | OUTPATIENT
Start: 2020-06-18 | End: 2020-06-18 | Stop reason: SURG

## 2020-06-18 RX ORDER — POTASSIUM CHLORIDE 750 MG/1
40 CAPSULE, EXTENDED RELEASE ORAL AS NEEDED
Status: DISCONTINUED | OUTPATIENT
Start: 2020-06-18 | End: 2020-06-19 | Stop reason: HOSPADM

## 2020-06-18 RX ORDER — SODIUM CHLORIDE 0.9 % (FLUSH) 0.9 %
3 SYRINGE (ML) INJECTION EVERY 12 HOURS SCHEDULED
Status: DISCONTINUED | OUTPATIENT
Start: 2020-06-18 | End: 2020-06-18 | Stop reason: HOSPADM

## 2020-06-18 RX ADMIN — PROPOFOL 40 MG: 10 INJECTION, EMULSION INTRAVENOUS at 11:13

## 2020-06-18 RX ADMIN — ROCURONIUM BROMIDE 10 MG: 10 INJECTION INTRAVENOUS at 11:13

## 2020-06-18 RX ADMIN — HYDROMORPHONE HYDROCHLORIDE 0.5 MG: 1 INJECTION, SOLUTION INTRAMUSCULAR; INTRAVENOUS; SUBCUTANEOUS at 23:03

## 2020-06-18 RX ADMIN — LIDOCAINE HYDROCHLORIDE 60 MG: 20 INJECTION, SOLUTION INFILTRATION; PERINEURAL at 10:50

## 2020-06-18 RX ADMIN — METOPROLOL TARTRATE 100 MG: 50 TABLET, FILM COATED ORAL at 08:26

## 2020-06-18 RX ADMIN — SODIUM CHLORIDE, PRESERVATIVE FREE 10 ML: 5 INJECTION INTRAVENOUS at 21:31

## 2020-06-18 RX ADMIN — DEXAMETHASONE SODIUM PHOSPHATE 4 MG: 4 INJECTION, SOLUTION INTRAMUSCULAR; INTRAVENOUS at 10:55

## 2020-06-18 RX ADMIN — HYDROMORPHONE HYDROCHLORIDE 0.5 MG: 1 INJECTION, SOLUTION INTRAMUSCULAR; INTRAVENOUS; SUBCUTANEOUS at 13:38

## 2020-06-18 RX ADMIN — ROCURONIUM BROMIDE 10 MG: 10 INJECTION INTRAVENOUS at 11:06

## 2020-06-18 RX ADMIN — HYDRALAZINE HYDROCHLORIDE 10 MG: 20 INJECTION INTRAMUSCULAR; INTRAVENOUS at 03:09

## 2020-06-18 RX ADMIN — FENTANYL CITRATE 25 MCG: 50 INJECTION, SOLUTION INTRAMUSCULAR; INTRAVENOUS at 10:50

## 2020-06-18 RX ADMIN — FENTANYL CITRATE 50 MCG: 50 INJECTION, SOLUTION INTRAMUSCULAR; INTRAVENOUS at 11:20

## 2020-06-18 RX ADMIN — POTASSIUM CHLORIDE 40 MEQ: 1.5 POWDER, FOR SOLUTION ORAL at 21:26

## 2020-06-18 RX ADMIN — PROPOFOL 110 MG: 10 INJECTION, EMULSION INTRAVENOUS at 10:50

## 2020-06-18 RX ADMIN — ONDANSETRON HYDROCHLORIDE 4 MG: 2 INJECTION INTRAMUSCULAR; INTRAVENOUS at 12:14

## 2020-06-18 RX ADMIN — PIPERACILLIN SODIUM AND TAZOBACTAM SODIUM 3.38 G: 3; .375 INJECTION, POWDER, LYOPHILIZED, FOR SOLUTION INTRAVENOUS at 11:39

## 2020-06-18 RX ADMIN — METOPROLOL TARTRATE 100 MG: 50 TABLET, FILM COATED ORAL at 21:26

## 2020-06-18 RX ADMIN — PROPOFOL 30 MG: 10 INJECTION, EMULSION INTRAVENOUS at 11:22

## 2020-06-18 RX ADMIN — PIPERACILLIN SODIUM AND TAZOBACTAM SODIUM 3.38 G: 3; .375 INJECTION, POWDER, LYOPHILIZED, FOR SOLUTION INTRAVENOUS at 04:25

## 2020-06-18 RX ADMIN — IPRATROPIUM BROMIDE AND ALBUTEROL SULFATE 3 ML: 2.5; .5 SOLUTION RESPIRATORY (INHALATION) at 10:14

## 2020-06-18 RX ADMIN — FENTANYL CITRATE 25 MCG: 50 INJECTION, SOLUTION INTRAMUSCULAR; INTRAVENOUS at 10:46

## 2020-06-18 RX ADMIN — SODIUM CHLORIDE 100 ML/HR: 900 INJECTION, SOLUTION INTRAVENOUS at 13:37

## 2020-06-18 RX ADMIN — SODIUM CHLORIDE: 900 INJECTION, SOLUTION INTRAVENOUS at 11:04

## 2020-06-18 NOTE — PLAN OF CARE
Problem: Patient Care Overview  Goal: Plan of Care Review  Outcome: Ongoing (interventions implemented as appropriate)  Flowsheets  Taken 6/18/2020 0456  Progress: improving  Outcome Summary: BP still elevated, coming down with PRN hydralazine. no complaints of pain this shift. pt NPO at midnight for surgery tomorrow with Dr. Miramontes. pt resting in between care. will continue to monitor.  Taken 6/17/2020 2004  Plan of Care Reviewed With: patient

## 2020-06-18 NOTE — PROGRESS NOTES
[facility] Medicine Services  INPATIENT PROGRESS NOTE    Length of Stay: 3  Date of Admission: 6/15/2020  Primary Care Physician: Provider, No Known    Subjective   Chief Complaint: abdominal pain, vomiting  HPI:  84 year old  male with past medical history of HTN who presented on 6/15/2020 with complaints of abdominal pain.  Patient was noted to have acute pancreatitis at outside facility.  CT of abdomen/pelvis was also remarkable for pericholecystic fluid.  He is currently admitted for acute cholecystitis and pancreatitis with GI and general surgery services following.  He is awaiting cholecystectomy later today with Dr. Miramontes.  During today's visit, the patient denies complaints.       Review of Systems   Constitutional: Negative for chills and fever.   Respiratory: Negative for cough, chest tightness, shortness of breath and wheezing.    Cardiovascular: Negative for chest pain, palpitations and leg swelling.   Gastrointestinal: Negative for abdominal pain, diarrhea, nausea and vomiting.   Musculoskeletal: Negative for back pain and neck pain.   Skin: Negative for pallor.   Neurological: Negative for dizziness, weakness and light-headedness.   Psychiatric/Behavioral: Negative for confusion. The patient is not nervous/anxious.         All pertinent negatives and positives are as above. All other systems have been reviewed and are negative unless otherwise stated.     Objective    Temp:  [97.1 °F (36.2 °C)-99.6 °F (37.6 °C)] 97.1 °F (36.2 °C)  Heart Rate:  [61-68] 65  Resp:  [16-20] 20  BP: (150-202)/(52-89) 202/83    Physical Exam   Constitutional: He is oriented to person, place, and time. He appears well-developed and well-nourished. No distress.   HENT:   Head: Normocephalic and atraumatic.   Right Ear: External ear normal.   Left Ear: External ear normal.   Nose: Nose normal.   Eyes: Conjunctivae are normal. Right eye exhibits no discharge. Left eye exhibits  no discharge. No scleral icterus.   Neck: Normal range of motion. Neck supple. No JVD present.   Cardiovascular: Normal rate, regular rhythm, normal heart sounds and intact distal pulses. Exam reveals no gallop and no friction rub.   No murmur heard.  Pulmonary/Chest: Effort normal and breath sounds normal. No stridor. No respiratory distress. He has no wheezes. He has no rales.   Abdominal: Soft. Bowel sounds are normal. He exhibits no distension. There is no tenderness.   Musculoskeletal: Normal range of motion. He exhibits no edema.   Neurological: He is alert and oriented to person, place, and time.   Skin: Skin is warm and dry. He is not diaphoretic.   Psychiatric: He has a normal mood and affect. His behavior is normal.   Nursing note and vitals reviewed.          Results Review:  I have reviewed the labs, radiology results, and diagnostic studies.    Laboratory Data:   Results from last 7 days   Lab Units 06/18/20  0600 06/17/20  0616 06/16/20  0626   SODIUM mmol/L 139 144 140   POTASSIUM mmol/L 3.3* 4.1 3.9   CHLORIDE mmol/L 110* 110* 106   CO2 mmol/L 20.0* 20.0* 19.0*   BUN mg/dL 17 27* 31*   CREATININE mg/dL 1.06 1.21 1.38*   GLUCOSE mg/dL 105* 104* 79   CALCIUM mg/dL 8.7 8.7 8.8   BILIRUBIN mg/dL 2.3* 3.5* 4.8*   ALK PHOS U/L 211* 170* 148*   ALT (SGPT) U/L 52* 57* 61*   AST (SGOT) U/L 68* 71* 77*   ANION GAP mmol/L 9.0 14.0 15.0     Estimated Creatinine Clearance: 50.8 mL/min (by C-G formula based on SCr of 1.06 mg/dL).  Results from last 7 days   Lab Units 06/15/20  0601   MAGNESIUM mg/dL 1.8   PHOSPHORUS mg/dL 3.2         Results from last 7 days   Lab Units 06/18/20  0600 06/17/20  0616 06/16/20  0626 06/15/20  0601   WBC 10*3/mm3 6.34 5.48 8.59 17.50*  17.00*   HEMOGLOBIN g/dL 11.9* 11.3* 11.3* 11.8*  11.9*   HEMATOCRIT % 34.5* 33.6* 34.2* 34.0*  34.2*   PLATELETS 10*3/mm3 147 123* 120* 137*  138*           Culture Data:   Blood Culture   Date Value Ref Range Status   06/15/2020 No growth at 2  days  Preliminary   06/15/2020 No growth at 2 days  Preliminary     No results found for: URINECX  No results found for: RESPCX  No results found for: WOUNDCX  No results found for: STOOLCX  No components found for: BODYFLD    Radiology Data:   Imaging Results (Last 24 Hours)     Procedure Component Value Units Date/Time    FL C Arm During Surgery [[id number]] Resulted:  06/18/20 1155     Updated:  06/18/20 1156          I have reviewed the patient's current medications.     Assessment/Plan     Active Hospital Problems    Diagnosis   • **Acute pancreatitis   • Acute cholecystitis   • Essential hypertension   • Cholecystitis     Added automatically from request for surgery [id number]         Plan:    1. Acute pancreatitis: improving. GI following.  Patient denies abdominal pain today.  Currently NPO awaiting cholecystectomy.  Lipase trending downward from [phone].  CBD normal on US.  Unable to undergo MRCP due to pacemaker  2. Acute cholecystitis: general surgery following with plans for cholecystectomy later today.  NPO.  Liver enzymes stable.  Alk phos slightly increased.  AST, ALT, total bilirubin decreased.   3. HTN: continue Lopressor, PRN Hydralazine.       Discharge Planning: awaiting cholecystectomy later today with general surgery.          This document has been electronically signed by APPLE Kidd on June 18, 2020 12:28

## 2020-06-18 NOTE — OP NOTE
CHOLECYSTECTOMY LAPAROSCOPIC INTRAOPERATIVE CHOLANGIOGRAM  Procedure Note    Kory Montalvo  6/18/2020    Pre-op Diagnosis:   Cholecystitis [K81.9]    Post-op Diagnosis:     Post-Op Diagnosis Codes:     * Cholecystitis [K81.9]    Procedure/CPT® Codes:      Procedure(s):  CHOLECYSTECTOMY LAPAROSCOPIC INTRAOPERATIVE CHOLANGIOGRAM    Surgeon(s):  Kirby Miramontes MD    Anesthesia: General    Staff:   Circulator: Adriana Beasley RN; Matthew Hopson RN  Radiology Technologist: Edi Paez  Scrub Person: Scarlett Rossi  Endo Technician: Carolina Quesada, DOREEN  Assistant: Chyna Mc CSA    Estimated Blood Loss: 20 mL    Specimens:                ID Type Source Tests Collected by Time   A (Not marked as sent) :  Tissue Gallbladder TISSUE PATHOLOGY EXAM Kirby Miramontes MD 6/18/2020 1121         Drains: * No LDAs found *    Indications: 84-year-old male was admitted a few days ago with pancreatitis.  Clinically is better from his pancreatitis.  His work-up was significant for HIDA scan which suggested good flow through the common bile duct but gallbladder did not light up.  Patient had ultrasound and CT scan which did not show any gallstones.  HIDA scan however suggested cholecystitis and there was concern he might have a small stone was not able to be seen much related to the pancreatitis.  Patient presents now for laparoscopic cholecystectomy and intraoperative cholangiogram    Findings: Chronically inflamed gallbladder with normal intraoperative cholangiogram good spontaneous flow of the duodenum no evidence of any common duct stones with good flow hepatic radicles and adequate small cystic duct.  No dilation of the biliary system to suggest any passage of stones either.    Complications: None     Procedure:  The patient was brought to the operating room where he was placed under general endotracheal anesthesia without any difficulty. He received Zosyn IV antibiotics perioperatively. He had SCDs in  place. His abdomen was prepped and draped in the normal sterile fashion. Appropriate timeout was taken. Cutdown was performed at the supraumbilical position. A 10/11 Cooper trocar was placed. Omentum was adhesed up around the cutdown site. We were able to get the camera into position away from this area and able to see. There were some adhesions inferior in the abdomen and along the midline. We placed another 5 mm trocar out laterally and through that trocar looked back at our initial cutdown site and there was omentum that was adhesed up around where the trocar had been placed. We placed another 5 mm trocar in the epigastrium. With that trocar in place, we used the Harmonic scalpel and blunt dissection, dissected the omentum down from around the cutdown site and we could see adequately. There was no bowel involved; it was all omentum. Once that was done, we then placed another 5 mm trocar out laterally. We did our TAP abdominal wall block with a total of 30 mL of 0.5% Marcaine; 20 mL were injected on the right side of the abdomen and 10 mL on the left side using the laparoscope for guidance. Once that was done, we assessed our abdomen. There were some adhesions up on the right side of the abdomen, mainly omentum again. We took that down and exposed the gallbladder well. The gallbladder appeared to be chronically inflamed, but it was soft, it was able to be easily grasped. We took the gallbladder down in a dome-down-type technique. We took it down approximately half the way out of the bed of the liver. It was on somewhat of a mesentery. We then pulled the gallbladder up over the edge of the liver and then opened the peritoneum on the anterior and posterior aspects of Calot triangle. We freed up the duodenum, got it well away from the gallbladder. I dissected down to the neck of the gallbladder junction with the cystic duct and dissected the cystic artery out well. A good critical view was obtained. We placed our  Michael clamp upon the neck of the gallbladder and then placed the catheter in position and obtained a cholangiogram with findings as described. We then placed 2 clips proximally on the cystic duct, 1 clip distally, 1 clip proximally on the cystic artery. We then divided the cystic duct right at the junction with the neck of the gallbladder. We placed a #1 PDS Endoloop around the cystic duct stump and left the Endoloop in position along with 2 clips on the cystic duct, divided the cystic artery with the Harmonic scalpel, leaving 1 clip on the cystic artery. We then completed our dissection which was very minimal at this point with Harmonic scalpel, freeing the gallbladder up completely. The gallbladder was placed in an Endo Catch bag and removed without any difficulty. Trocars were removed. Good hemostasis was noted at all sites. Fascia was closed at the cutdown site with a 2-0 Vicryl figure-of-8 stitch. The skin was closed at all sites with a 4-0 Monocryl subcuticular stitch. Glue was used for final skin closure. The patient was awakened, extubated and transferred to the recovery room, awake, in stable condition.         Disposition: Transfer to recovery room in stable condition        Kirby Miramontes MD     Date: 6/18/2020  Time: 12:13

## 2020-06-18 NOTE — PLAN OF CARE
Problem: Patient Care Overview  Goal: Plan of Care Review  Outcome: Ongoing (interventions implemented as appropriate)  Flowsheets (Taken 6/18/2020 1145)  Progress: no change  Plan of Care Reviewed With: patient  Outcome Summary: Patient's blood pressure is still elevated. Patient having lap santa today with Dr. Miramontes. NPO since midnight. Vitals stable. Will continue to monitor.

## 2020-06-18 NOTE — ANESTHESIA PROCEDURE NOTES
Airway  Date/Time: 6/18/2020 10:54 AM  Airway not difficult    General Information and Staff    Patient location during procedure: OR  CRNA: Vaibhav Saha CRNA    Indications and Patient Condition  Indications for airway management: airway protection    Preoxygenated: yes  Mask difficulty assessment: 1 - vent by mask    Final Airway Details  Final airway type: endotracheal airway      Successful airway: ETT  Cuffed: yes   Successful intubation technique: direct laryngoscopy  Facilitating devices/methods: intubating stylet  Endotracheal tube insertion site: oral  Blade: Yaima  Blade size: 3  ETT size (mm): 7.5  Cormack-Lehane Classification: grade I - full view of glottis  Placement verified by: chest auscultation and capnometry   Cuff volume (mL): 7  Measured from: lips  ETT/EBT  to lips (cm): 20  Number of attempts at approach: 1  Assessment: lips, teeth, and gum same as pre-op and atraumatic intubation    Additional Comments  ETT inserted by KERA melendez SRNA

## 2020-06-18 NOTE — ANESTHESIA PREPROCEDURE EVALUATION
Anesthesia Evaluation     no history of anesthetic complications:  NPO Solid Status: > 8 hours  NPO Liquid Status: > 8 hours           Airway   Mallampati: I  TM distance: >3 FB  Neck ROM: full  No difficulty expected  Dental    (+) upper dentures and lower dentures    Pulmonary    (+) decreased breath sounds,   (-) COPD, asthma, sleep apnea, not a smoker    ROS comment: cough  Cardiovascular - normal exam  Exercise tolerance: poor (<4 METS)    ECG reviewed  Patient on routine beta blocker  Rhythm: regular  Rate: normal    (+) pacemaker pacemaker, hypertension less than 2 medications, dysrhythmias,   (-) past MI, angina, murmur, cardiac stents, DVT    ROS comment: Electronic atrial pacemaker  Incomplete right bundle branch block  Borderline ECG  No previous ECGs available  Confirmed by AKRAM    Neuro/Psych  (-) seizures, TIA, CVA, headaches, psychiatric history  GI/Hepatic/Renal/Endo    (+)  GERD well controlled,    (-) hepatitis, liver disease, no renal disease, diabetes, no thyroid disorder    ROS Comment: Cholecystitis      Musculoskeletal     (+) arthralgias, back pain,   Abdominal    Substance History   (+) alcohol use (wine occ),   (-) drug use     OB/GYN          Other   arthritis, blood dyscrasia anemia,   history of cancer (colon)    ROS/Med Hx Other: K 3.3                Anesthesia Plan    ASA 3     general   (EKG ordered  Will need duoneb in same day)  intravenous induction     Anesthetic plan, all risks, benefits, and alternatives have been provided, discussed and informed consent has been obtained with: patient.

## 2020-06-18 NOTE — PROGRESS NOTES
SUBJECTIVE:   6/18/2020  Chief Complaint:     Subjective      Patient feels some better today.  Has upper abdominal pain. S/P santa today. IOC unremarkable. No further nausea or vomiting.  Leukocytosis have resolved.  LFTs are improving.  Bili decreased to 2.3. Lipase 409.    History:  History reviewed. No pertinent past medical history.  History reviewed. No pertinent surgical history.  History reviewed. No pertinent family history.  Social History     Tobacco Use   • Smoking status: Former Smoker     Packs/day: 1.50     Years: 52.00     Pack years: 78.00     Types: Cigarettes   • Smokeless tobacco: Former User     Types: Chew   Substance Use Topics   • Alcohol use: Not on file   • Drug use: Not on file     Medications Prior to Admission   Medication Sig Dispense Refill Last Dose   • furosemide (LASIX) 20 MG tablet Take 20 mg by mouth Daily As Needed. 1/2 tablet prn for leg swelling      • metoprolol tartrate (LOPRESSOR) 100 MG tablet Take 100 mg by mouth 2 (Two) Times a Day.      • vitamin B-12 (CYANOCOBALAMIN) 500 MCG tablet Take 500 mcg by mouth Daily.        Allergies:  Patient has no known allergies.     CURRENT MEDICATIONS/OBJECTIVE/VS/PE:     Current Medications:     Current Facility-Administered Medications   Medication Dose Route Frequency Provider Last Rate Last Dose   • bisacodyl (DULCOLAX) EC tablet 5 mg  5 mg Oral Daily PRN Kirby Miramontes MD       • bisacodyl (DULCOLAX) suppository 10 mg  10 mg Rectal Daily PRN Kirby Miramontes MD       • calcium carbonate (TUMS) chewable tablet 500 mg (200 mg elemental)  2 tablet Oral BID PRN Kirby Miramontes MD       • famotidine (PEPCID) tablet 40 mg  40 mg Oral Daily Kirby Miramontes MD   40 mg at 06/17/20 0858   • hydrALAZINE (APRESOLINE) injection 10 mg  10 mg Intravenous Q6H PRN Kirby Miramontes MD   10 mg at 06/18/20 0309   • hydrALAZINE (APRESOLINE) injection 5 mg  5 mg Intravenous Once Vaibhav Saha CRNA       • HYDROcodone-acetaminophen  (NORCO) 7.5-325 MG per tablet 1 tablet  1 tablet Oral Q6H PRN Kirby Miramontes MD       • HYDROmorphone (DILAUDID) injection 0.5 mg  0.5 mg Intravenous Q4H PRN Kirby Miramontes MD   0.5 mg at 06/18/20 1338   • magnesium hydroxide (MILK OF MAGNESIA) suspension 2400 mg/10mL 10 mL  10 mL Oral Daily PRN Kirby Miramontes MD       • melatonin tablet 5.25 mg  5.25 mg Oral Nightly PRN Kirby Miramontes MD       • metoprolol tartrate (LOPRESSOR) tablet 100 mg  100 mg Oral Q12H Kirby Miramontes MD   100 mg at 06/18/20 0826   • ondansetron (ZOFRAN) tablet 4 mg  4 mg Oral Q6H PRN Kirby Miramontes MD        Or   • ondansetron (ZOFRAN) injection 4 mg  4 mg Intravenous Q6H PRN Kirby Miramontes MD       • Pharmacy to Dose Zosyn   Does not apply Continuous PRN Kirby Miramontes MD       • sodium chloride 0.9 % flush 10 mL  10 mL Intravenous Q12H Kirby Miramontes MD   10 mL at 06/17/20 0859   • sodium chloride 0.9 % flush 10 mL  10 mL Intravenous PRN Kirby Miramontes MD       • sodium chloride 0.9 % infusion  100 mL/hr Intravenous Continuous Kirby Miramontes  mL/hr at 06/18/20 1337 100 mL/hr at 06/18/20 1337       Objective     Review of Systems:   Review of Systems   Constitutional: Negative for chills, fatigue, fever and unexpected weight change.   HENT: Negative for congestion, ear discharge, hearing loss, nosebleeds and sore throat.    Eyes: Negative for pain, discharge and redness.   Respiratory: Negative for cough, chest tightness, shortness of breath and wheezing.    Cardiovascular: Negative for chest pain and palpitations.   Gastrointestinal: Positive for abdominal pain. Negative for abdominal distention, blood in stool, constipation, diarrhea, nausea and vomiting.   Endocrine: Negative for cold intolerance, polydipsia, polyphagia and polyuria.   Genitourinary: Negative for dysuria, flank pain, frequency, hematuria and urgency.   Musculoskeletal: Negative for arthralgias, back pain, joint swelling  and myalgias.   Skin: Negative for color change, pallor and rash.   Neurological: Negative for tremors, seizures, syncope, weakness and headaches.   Hematological: Negative for adenopathy. Does not bruise/bleed easily.   Psychiatric/Behavioral: Negative for behavioral problems, confusion, dysphoric mood, hallucinations and suicidal ideas. The patient is not nervous/anxious.        Physical Exam:   Temp:  [96.9 °F (36.1 °C)-99.6 °F (37.6 °C)] 97.1 °F (36.2 °C)  Heart Rate:  [62-68] 66  Resp:  [16-20] 20  BP: (150-202)/(52-83) 157/78     Physical Exam:  General Appearance:    Alert, cooperative, in no acute distress   Head:    Normocephalic, without obvious abnormality, atraumatic   Eyes:            Lids and lashes normal, conjunctivae and sclerae normal, no   icterus, no pallor, corneas clear, PERRLA   Ears:    Ears appear intact with no abnormalities noted   Throat:   No oral lesions, no thrush, oral mucosa moist   Neck:   No adenopathy, supple, trachea midline, no thyromegaly, no     carotid bruit, no JVD   Back:     No kyphosis present, no scoliosis present, no skin lesions,       erythema or scars, no tenderness to percussion or                   palpation,   range of motion normal   Lungs:     Clear to auscultation,respirations regular, even and                   unlabored    Heart:    Regular rhythm and normal rate, normal S1 and S2, no            murmur, no gallop, no rub, no click   Breast Exam:    Deferred   Abdomen:     Normal bowel sounds, no masses, no organomegaly, soft        non-tender, non-distended, no guarding, no rebound                 tenderness   Genitalia:    Deferred   Extremities:   Moves all extremities well, no edema, no cyanosis, no              redness   Pulses:   Pulses palpable and equal bilaterally   Skin:   No bleeding, bruising or rash   Lymph nodes:   No palpable adenopathy   Neurologic:   Cranial nerves 2 - 12 grossly intact, sensation intact, DTR        present and equal  bilaterally      Results Review:     Lab Results (last 24 hours)     Procedure Component Value Units Date/Time    Blood Culture - Blood, Arm, Right [[id number]] Collected:  06/15/20 1511    Specimen:  Blood from Arm, Right Updated:  06/18/20 1530     Blood Culture No growth at 3 days    Blood Culture - Blood, Arm, Right [[id number]] Collected:  06/15/20 1318    Specimen:  Blood from Arm, Right Updated:  06/18/20 1330     Blood Culture No growth at 3 days    Tissue Pathology Exam [[id number]] Collected:  06/18/20 1121    Specimen:  Tissue from Gallbladder Updated:  06/18/20 1315    Lipase [[id number]]  (Abnormal) Collected:  06/18/20 0600    Specimen:  Blood Updated:  06/18/20 0646     Lipase 409 U/L     Comprehensive Metabolic Panel [[id number]]  (Abnormal) Collected:  06/18/20 0600    Specimen:  Blood Updated:  06/18/20 0633     Glucose 105 mg/dL      BUN 17 mg/dL      Creatinine 1.06 mg/dL      Sodium 139 mmol/L      Potassium 3.3 mmol/L      Chloride 110 mmol/L      CO2 20.0 mmol/L      Calcium 8.7 mg/dL      Total Protein 6.1 g/dL      Albumin 3.00 g/dL      ALT (SGPT) 52 U/L      AST (SGOT) 68 U/L      Alkaline Phosphatase 211 U/L      Total Bilirubin 2.3 mg/dL      eGFR Non African Amer 67 mL/min/1.73      Globulin 3.1 gm/dL      A/G Ratio 1.0 g/dL      BUN/Creatinine Ratio 16.0     Anion Gap 9.0 mmol/L     Narrative:       GFR Normal >60  Chronic Kidney Disease <60  Kidney Failure <15      CBC & Differential [[id number]] Collected:  06/18/20 0600    Specimen:  Blood Updated:  06/18/20 0617    Narrative:       The following orders were created for panel order CBC & Differential.  Procedure                               Abnormality         Status                     ---------                               -----------         ------                     CBC Auto Differential[[id number]]        Abnormal            Final result                 Please view results for these tests on the individual orders.    CBC Auto  Differential [[id number]]  (Abnormal) Collected:  06/18/20 0600    Specimen:  Blood Updated:  06/18/20 0617     WBC 6.34 10*3/mm3      RBC 3.88 10*6/mm3      Hemoglobin 11.9 g/dL      Hematocrit 34.5 %      MCV 88.9 fL      MCH 30.7 pg      MCHC 34.5 g/dL      RDW 12.8 %      RDW-SD 42.0 fl      MPV 10.7 fL      Platelets 147 10*3/mm3      Neutrophil % 67.6 %      Lymphocyte % 18.0 %      Monocyte % 9.6 %      Eosinophil % 3.8 %      Basophil % 0.2 %      Immature Grans % 0.8 %      Neutrophils, Absolute 4.29 10*3/mm3      Lymphocytes, Absolute 1.14 10*3/mm3      Monocytes, Absolute 0.61 10*3/mm3      Eosinophils, Absolute 0.24 10*3/mm3      Basophils, Absolute 0.01 10*3/mm3      Immature Grans, Absolute 0.05 10*3/mm3      nRBC 0.0 /100 WBC     COVID PRE-OP / PRE-PROCEDURE SCREENING ORDER (NO ISOLATION) - Swab, Nasopharynx [[id number]] Collected:  06/17/20 1343    Specimen:  Swab from Nasopharynx Updated:  06/17/20 1838    Narrative:       The following orders were created for panel order COVID PRE-OP / PRE-PROCEDURE SCREENING ORDER (NO ISOLATION) - Swab, Nasopharynx.  Procedure                               Abnormality         Status                     ---------                               -----------         ------                     COVID-19, [facility]...[[id number]]  Normal              Final result                 Please view results for these tests on the individual orders.    COVID-19, [facility] IN-HOUSE, NP SWAB IN TRANSPORT MEDIA 8-10 HR TAT - Swab, Nasopharynx [[id number]]  (Normal) Collected:  06/17/20 1343    Specimen:  Swab from Nasopharynx Updated:  06/17/20 1838     COVID19 Not Detected    Narrative:       Testing performed by Real Time RT-PCR  This test has not been approved by the [facility] but is authorized under the Emergency Use Act (EUA)    https://www.fda.gov/media/813695/download    https://www.fda.gov/media/633914/download               I reviewed the patient's new clinical results.  I reviewed  the patient's new imaging results and agree with the interpretation.     ASSESSMENT/PLAN:   ASSESSMENT: 1.  Acute pancreatitis, resolving.  2.  Elevated liver enzymes, improving.  Likely due to CBD stone passage.  Common bile duct appears normal on the ultrasound.  Patient unable to have MRCP due to the presence of pacemaker.  3.  Leukocytosis, resolved.  4.  Acute cholecystitis S/P santa    PLAN: 1.  Cont current post op course  2.  Follow LFTs closely.  3.  Continue antibiotics   The risks, benefits, and alternatives of this procedure have been discussed with the patient or the responsible party- the patient understands and agrees to proceed.         Hipolito Stringer MD  06/18/20  15:59

## 2020-06-18 NOTE — ANESTHESIA POSTPROCEDURE EVALUATION
Patient: Kory Montalvo    Procedure Summary     Date:  06/18/20 Room / Location:  [facility] OR 09 / [facility] OR    Anesthesia Start:  1045 Anesthesia Stop:  1222    Procedure:  CHOLECYSTECTOMY LAPAROSCOPIC INTRAOPERATIVE CHOLANGIOGRAM (N/A Abdomen) Diagnosis:       Cholecystitis      (Cholecystitis [K81.9])    Surgeon:  Kirby Miramontes MD Provider:  Brandon Juárez MD    Anesthesia Type:  general ASA Status:  3          Anesthesia Type: general    Vitals  No vitals data found for the desired time range.          Post Anesthesia Care and Evaluation    Patient location during evaluation: PACU  Level of consciousness: sleepy but conscious  Pain score: 0  Pain management: adequate  Airway patency: patent  Anesthetic complications: No anesthetic complications  PONV Status: none  Cardiovascular status: hemodynamically stable, hypertensive and stable  Respiratory status: acceptable and spontaneous ventilation  Hydration status: acceptable    Comments: o2 via simple mask to and in pacu

## 2020-06-19 VITALS
HEART RATE: 65 BPM | OXYGEN SATURATION: 95 % | HEIGHT: 70 IN | DIASTOLIC BLOOD PRESSURE: 76 MMHG | SYSTOLIC BLOOD PRESSURE: 188 MMHG | BODY MASS INDEX: 22.13 KG/M2 | WEIGHT: 154.6 LBS | TEMPERATURE: 97.8 F | RESPIRATION RATE: 16 BRPM

## 2020-06-19 LAB
ALBUMIN SERPL-MCNC: 3.1 G/DL (ref 3.5–5.2)
ALBUMIN/GLOB SERPL: 1.1 G/DL
ALP SERPL-CCNC: 194 U/L (ref 39–117)
ALT SERPL W P-5'-P-CCNC: 46 U/L (ref 1–41)
ANION GAP SERPL CALCULATED.3IONS-SCNC: 8 MMOL/L (ref 5–15)
AST SERPL-CCNC: 59 U/L (ref 1–40)
BASOPHILS # BLD AUTO: 0.01 10*3/MM3 (ref 0–0.2)
BASOPHILS NFR BLD AUTO: 0.1 % (ref 0–1.5)
BILIRUB SERPL-MCNC: 1.3 MG/DL (ref 0.2–1.2)
BUN BLD-MCNC: 18 MG/DL (ref 8–23)
BUN/CREAT SERPL: 19.6 (ref 7–25)
CALCIUM SPEC-SCNC: 8.4 MG/DL (ref 8.6–10.5)
CHLORIDE SERPL-SCNC: 110 MMOL/L (ref 98–107)
CO2 SERPL-SCNC: 19 MMOL/L (ref 22–29)
CREAT BLD-MCNC: 0.92 MG/DL (ref 0.76–1.27)
DEPRECATED RDW RBC AUTO: 44.1 FL (ref 37–54)
EOSINOPHIL # BLD AUTO: 0 10*3/MM3 (ref 0–0.4)
EOSINOPHIL NFR BLD AUTO: 0 % (ref 0.3–6.2)
ERYTHROCYTE [DISTWIDTH] IN BLOOD BY AUTOMATED COUNT: 13.1 % (ref 12.3–15.4)
GFR SERPL CREATININE-BSD FRML MDRD: 79 ML/MIN/1.73
GLOBULIN UR ELPH-MCNC: 2.9 GM/DL
GLUCOSE BLD-MCNC: 122 MG/DL (ref 65–99)
HCT VFR BLD AUTO: 33.4 % (ref 37.5–51)
HGB BLD-MCNC: 11.2 G/DL (ref 13–17.7)
IMM GRANULOCYTES # BLD AUTO: 0.05 10*3/MM3 (ref 0–0.05)
IMM GRANULOCYTES NFR BLD AUTO: 0.7 % (ref 0–0.5)
LYMPHOCYTES # BLD AUTO: 0.81 10*3/MM3 (ref 0.7–3.1)
LYMPHOCYTES NFR BLD AUTO: 11.6 % (ref 19.6–45.3)
MCH RBC QN AUTO: 30.9 PG (ref 26.6–33)
MCHC RBC AUTO-ENTMCNC: 33.5 G/DL (ref 31.5–35.7)
MCV RBC AUTO: 92 FL (ref 79–97)
MONOCYTES # BLD AUTO: 0.46 10*3/MM3 (ref 0.1–0.9)
MONOCYTES NFR BLD AUTO: 6.6 % (ref 5–12)
NEUTROPHILS # BLD AUTO: 5.68 10*3/MM3 (ref 1.7–7)
NEUTROPHILS NFR BLD AUTO: 81 % (ref 42.7–76)
NRBC BLD AUTO-RTO: 0 /100 WBC (ref 0–0.2)
PLATELET # BLD AUTO: 146 10*3/MM3 (ref 140–450)
PMV BLD AUTO: 10.9 FL (ref 6–12)
POTASSIUM BLD-SCNC: 4.5 MMOL/L (ref 3.5–5.2)
POTASSIUM BLD-SCNC: 4.5 MMOL/L (ref 3.5–5.2)
PROT SERPL-MCNC: 6 G/DL (ref 6–8.5)
RBC # BLD AUTO: 3.63 10*6/MM3 (ref 4.14–5.8)
SODIUM BLD-SCNC: 137 MMOL/L (ref 136–145)
WBC NRBC COR # BLD: 7.01 10*3/MM3 (ref 3.4–10.8)

## 2020-06-19 PROCEDURE — 85025 COMPLETE CBC W/AUTO DIFF WBC: CPT | Performed by: SURGERY

## 2020-06-19 PROCEDURE — 80053 COMPREHEN METABOLIC PANEL: CPT | Performed by: SURGERY

## 2020-06-19 PROCEDURE — 99231 SBSQ HOSP IP/OBS SF/LOW 25: CPT | Performed by: INTERNAL MEDICINE

## 2020-06-19 PROCEDURE — 25010000002 HYDRALAZINE PER 20 MG: Performed by: SURGERY

## 2020-06-19 PROCEDURE — 84132 ASSAY OF SERUM POTASSIUM: CPT | Performed by: NURSE PRACTITIONER

## 2020-06-19 RX ORDER — HYDROCODONE BITARTRATE AND ACETAMINOPHEN 7.5; 325 MG/1; MG/1
1 TABLET ORAL EVERY 6 HOURS PRN
Qty: 12 TABLET | Refills: 0 | Status: SHIPPED | OUTPATIENT
Start: 2020-06-19 | End: 2020-06-28

## 2020-06-19 RX ORDER — ONDANSETRON 4 MG/1
4 TABLET, FILM COATED ORAL EVERY 6 HOURS PRN
Qty: 30 TABLET | Refills: 0 | Status: SHIPPED | OUTPATIENT
Start: 2020-06-19

## 2020-06-19 RX ORDER — FAMOTIDINE 40 MG/1
40 TABLET, FILM COATED ORAL DAILY
Qty: 30 TABLET | Refills: 0 | Status: SHIPPED | OUTPATIENT
Start: 2020-06-20

## 2020-06-19 RX ADMIN — HYDROCODONE BITARTRATE AND ACETAMINOPHEN 1 TABLET: 7.5; 325 TABLET ORAL at 11:22

## 2020-06-19 RX ADMIN — FAMOTIDINE 40 MG: 40 TABLET, FILM COATED ORAL at 08:32

## 2020-06-19 RX ADMIN — HYDRALAZINE HYDROCHLORIDE 10 MG: 20 INJECTION INTRAMUSCULAR; INTRAVENOUS at 08:35

## 2020-06-19 RX ADMIN — SODIUM CHLORIDE 100 ML/HR: 900 INJECTION, SOLUTION INTRAVENOUS at 00:29

## 2020-06-19 RX ADMIN — METOPROLOL TARTRATE 100 MG: 50 TABLET, FILM COATED ORAL at 08:32

## 2020-06-19 RX ADMIN — POTASSIUM CHLORIDE 40 MEQ: 10 CAPSULE, COATED, EXTENDED RELEASE ORAL at 03:22

## 2020-06-19 NOTE — PROGRESS NOTES
SUBJECTIVE:   6/19/2020  Chief Complaint:     Subjective      Patient feels much better today.  upper abdominal pain improving. S/P santa yesterday. IOC unremarkable. No further nausea or vomiting.  Leukocytosis have resolved.  LFTs are improving.  Bili decreased to 1.3.     History:  History reviewed. No pertinent past medical history.  Past Surgical History:   Procedure Laterality Date   • CHOLECYSTECTOMY WITH INTRAOPERATIVE CHOLANGIOGRAM N/A 6/18/2020    Procedure: CHOLECYSTECTOMY LAPAROSCOPIC INTRAOPERATIVE CHOLANGIOGRAM;  Surgeon: Kirby Miramontes MD;  Location: [facility];  Service: General;  Laterality: N/A;     History reviewed. No pertinent family history.  Social History     Tobacco Use   • Smoking status: Former Smoker     Packs/day: 1.50     Years: 52.00     Pack years: 78.00     Types: Cigarettes   • Smokeless tobacco: Former User     Types: Chew   Substance Use Topics   • Alcohol use: Not on file   • Drug use: Not on file     Medications Prior to Admission   Medication Sig Dispense Refill Last Dose   • furosemide (LASIX) 20 MG tablet Take 20 mg by mouth Daily As Needed. 1/2 tablet prn for leg swelling      • metoprolol tartrate (LOPRESSOR) 100 MG tablet Take 100 mg by mouth 2 (Two) Times a Day.      • vitamin B-12 (CYANOCOBALAMIN) 500 MCG tablet Take 500 mcg by mouth Daily.        Allergies:  Patient has no known allergies.     CURRENT MEDICATIONS/OBJECTIVE/VS/PE:     Current Medications:     Current Facility-Administered Medications   Medication Dose Route Frequency Provider Last Rate Last Dose   • bisacodyl (DULCOLAX) EC tablet 5 mg  5 mg Oral Daily PRN Kirby Miramontes MD       • bisacodyl (DULCOLAX) suppository 10 mg  10 mg Rectal Daily PRN Kirby Miramontes MD       • calcium carbonate (TUMS) chewable tablet 500 mg (200 mg elemental)  2 tablet Oral BID PRN Kirby Miramontes MD       • famotidine (PEPCID) tablet 40 mg  40 mg Oral Daily Kirby Miramontes MD   40 mg at 06/19/20 0832   •  hydrALAZINE (APRESOLINE) injection 10 mg  10 mg Intravenous Q6H PRN Kirby Miramontes MD   10 mg at 06/19/20 0835   • hydrALAZINE (APRESOLINE) injection 5 mg  5 mg Intravenous Once Vaibhav Saha CRNA       • HYDROcodone-acetaminophen (NORCO) 7.5-325 MG per tablet 1 tablet  1 tablet Oral Q6H PRN Kirby Miramontes MD   1 tablet at 06/19/20 1122   • HYDROmorphone (DILAUDID) injection 0.5 mg  0.5 mg Intravenous Q4H PRN Kirby Miramontes MD   0.5 mg at 06/18/20 2303   • magnesium hydroxide (MILK OF MAGNESIA) suspension 2400 mg/10mL 10 mL  10 mL Oral Daily PRN Kirby Miramontes MD       • melatonin tablet 5.25 mg  5.25 mg Oral Nightly PRN Kirby Miramontes MD       • metoprolol tartrate (LOPRESSOR) tablet 100 mg  100 mg Oral Q12H Kirby Miramontes MD   100 mg at 06/19/20 0832   • ondansetron (ZOFRAN) tablet 4 mg  4 mg Oral Q6H PRN Kirby Miramontes MD        Or   • ondansetron (ZOFRAN) injection 4 mg  4 mg Intravenous Q6H PRN Kirby Miramontes MD       • Pharmacy to Dose Zosyn   Does not apply Continuous PRN Kirby Miramontes MD       • potassium chloride (KLOR-CON) packet 40 mEq  40 mEq Oral PRN Atiya Duran APRN   40 mEq at 06/18/20 2126   • potassium chloride (MICRO-K) CR capsule 40 mEq  40 mEq Oral PRN Atiya Duran APRN   40 mEq at 06/19/20 0322   • sodium chloride 0.9 % flush 10 mL  10 mL Intravenous Q12H Kirby Miramontes MD   10 mL at 06/18/20 2131   • sodium chloride 0.9 % flush 10 mL  10 mL Intravenous PRN Kirby Miramontes MD       • sodium chloride 0.9 % infusion  100 mL/hr Intravenous Continuous Kirby Miramontes  mL/hr at 06/19/20 0029 100 mL/hr at 06/19/20 0029       Objective     Review of Systems:   Review of Systems   Constitutional: Negative for chills, fatigue, fever and unexpected weight change.   HENT: Negative for congestion, ear discharge, hearing loss, nosebleeds and sore throat.    Eyes: Negative for pain, discharge and redness.   Respiratory: Negative for cough, chest  tightness, shortness of breath and wheezing.    Cardiovascular: Negative for chest pain and palpitations.   Gastrointestinal: Positive for abdominal pain. Negative for abdominal distention, blood in stool, constipation, diarrhea, nausea and vomiting.   Endocrine: Negative for cold intolerance, polydipsia, polyphagia and polyuria.   Genitourinary: Negative for dysuria, flank pain, frequency, hematuria and urgency.   Musculoskeletal: Negative for arthralgias, back pain, joint swelling and myalgias.   Skin: Negative for color change, pallor and rash.   Neurological: Negative for tremors, seizures, syncope, weakness and headaches.   Hematological: Negative for adenopathy. Does not bruise/bleed easily.   Psychiatric/Behavioral: Negative for behavioral problems, confusion, dysphoric mood, hallucinations and suicidal ideas. The patient is not nervous/anxious.        Physical Exam:   Temp:  [96.7 °F (35.9 °C)-97.8 °F (36.6 °C)] 97.8 °F (36.6 °C)  Heart Rate:  [60-75] 65  Resp:  [16-20] 16  BP: (150-202)/(60-83) 188/76     Physical Exam:  General Appearance:    Alert, cooperative, in no acute distress   Head:    Normocephalic, without obvious abnormality, atraumatic   Eyes:            Lids and lashes normal, conjunctivae and sclerae normal, no   icterus, no pallor, corneas clear, PERRLA   Ears:    Ears appear intact with no abnormalities noted   Throat:   No oral lesions, no thrush, oral mucosa moist   Neck:   No adenopathy, supple, trachea midline, no thyromegaly, no     carotid bruit, no JVD   Back:     No kyphosis present, no scoliosis present, no skin lesions,       erythema or scars, no tenderness to percussion or                   palpation,   range of motion normal   Lungs:     Clear to auscultation,respirations regular, even and                   unlabored    Heart:    Regular rhythm and normal rate, normal S1 and S2, no            murmur, no gallop, no rub, no click   Breast Exam:    Deferred   Abdomen:     Normal  bowel sounds, no masses, no organomegaly, soft        non-tender, non-distended, no guarding, no rebound                 tenderness   Genitalia:    Deferred   Extremities:   Moves all extremities well, no edema, no cyanosis, no              redness   Pulses:   Pulses palpable and equal bilaterally   Skin:   No bleeding, bruising or rash   Lymph nodes:   No palpable adenopathy   Neurologic:   Cranial nerves 2 - 12 grossly intact, sensation intact, DTR        present and equal bilaterally      Results Review:     Lab Results (last 24 hours)     Procedure Component Value Units Date/Time    Potassium [[id number]]  (Normal) Collected:  06/19/20 0609    Specimen:  Blood Updated:  06/19/20 0649     Potassium 4.5 mmol/L     Comprehensive Metabolic Panel [[id number]]  (Abnormal) Collected:  06/19/20 0609    Specimen:  Blood Updated:  06/19/20 0649     Glucose 122 mg/dL      BUN 18 mg/dL      Creatinine 0.92 mg/dL      Sodium 137 mmol/L      Potassium 4.5 mmol/L      Chloride 110 mmol/L      CO2 19.0 mmol/L      Calcium 8.4 mg/dL      Total Protein 6.0 g/dL      Albumin 3.10 g/dL      ALT (SGPT) 46 U/L      AST (SGOT) 59 U/L      Alkaline Phosphatase 194 U/L      Total Bilirubin 1.3 mg/dL      eGFR Non African Amer 79 mL/min/1.73      Globulin 2.9 gm/dL      A/G Ratio 1.1 g/dL      BUN/Creatinine Ratio 19.6     Anion Gap 8.0 mmol/L     Narrative:       GFR Normal >60  Chronic Kidney Disease <60  Kidney Failure <15      CBC & Differential [[id number]] Collected:  06/19/20 0608    Specimen:  Blood Updated:  06/19/20 0638    Narrative:       The following orders were created for panel order CBC & Differential.  Procedure                               Abnormality         Status                     ---------                               -----------         ------                     CBC Auto Differential[[id number]]        Abnormal            Final result                 Please view results for these tests on the individual orders.     CBC Auto Differential [[id number]]  (Abnormal) Collected:  06/19/20 0608    Specimen:  Blood Updated:  06/19/20 0638     WBC 7.01 10*3/mm3      RBC 3.63 10*6/mm3      Hemoglobin 11.2 g/dL      Hematocrit 33.4 %      MCV 92.0 fL      MCH 30.9 pg      MCHC 33.5 g/dL      RDW 13.1 %      RDW-SD 44.1 fl      MPV 10.9 fL      Platelets 146 10*3/mm3      Neutrophil % 81.0 %      Lymphocyte % 11.6 %      Monocyte % 6.6 %      Eosinophil % 0.0 %      Basophil % 0.1 %      Immature Grans % 0.7 %      Neutrophils, Absolute 5.68 10*3/mm3      Lymphocytes, Absolute 0.81 10*3/mm3      Monocytes, Absolute 0.46 10*3/mm3      Eosinophils, Absolute 0.00 10*3/mm3      Basophils, Absolute 0.01 10*3/mm3      Immature Grans, Absolute 0.05 10*3/mm3      nRBC 0.0 /100 WBC     Blood Culture - Blood, Arm, Right [[id number]] Collected:  06/15/20 1511    Specimen:  Blood from Arm, Right Updated:  06/18/20 1530     Blood Culture No growth at 3 days    Blood Culture - Blood, Arm, Right [[id number]] Collected:  06/15/20 1318    Specimen:  Blood from Arm, Right Updated:  06/18/20 1330     Blood Culture No growth at 3 days    Tissue Pathology Exam [[id number]] Collected:  06/18/20 1121    Specimen:  Tissue from Gallbladder Updated:  06/18/20 1315           I reviewed the patient's new clinical results.  I reviewed the patient's new imaging results and agree with the interpretation.     ASSESSMENT/PLAN:   ASSESSMENT: 1.  Acute pancreatitis, resolving.  2.  Elevated liver enzymes, improving.  Likely due to CBD stone passage.  Common bile duct appears normal on the ultrasound.  Patient unable to have MRCP due to the presence of pacemaker.  3.  Leukocytosis, resolved.  4.  Acute cholecystitis S/P santa    PLAN: 1.  Okay to DC from GI standpoint with clinic follow-up in 2 weeks.  2.  Follow LFTs closely.    The risks, benefits, and alternatives of this procedure have been discussed with the patient or the responsible party- the patient understands and  agrees to proceed.         Hipolito Stringer MD  06/19/20  11:34

## 2020-06-19 NOTE — PLAN OF CARE
Problem: Patient Care Overview  Goal: Plan of Care Review  Outcome: Ongoing (interventions implemented as appropriate)  Flowsheets  Taken 6/19/2020 1920  Progress: improving  Outcome Summary: VSS, bp under control with home medication, pain medicated, potassium redraw due at 700a, will cont to monitor  Taken 6/18/2020 2011  Plan of Care Reviewed With: patient

## 2020-06-19 NOTE — DISCHARGE SUMMARY
[facility] Medicine Services  DISCHARGE SUMMARY       Date of Admission: 6/15/2020  Date of Discharge:  6/19/2020  Primary Care Physician: Provider, No Known    Presenting Problem/History of Present Illness:  Acute pancreatitis [K85.90]       Final Discharge Diagnoses:  Active Hospital Problems    Diagnosis   • **Acute pancreatitis   • Acute cholecystitis   • Essential hypertension   • Cholecystitis     Added automatically from request for surgery [id number]         Consults:   Consults     Date and Time Order Name Status Description    6/16/2020 1611 Inpatient General Surgery Consult Completed     6/15/2020 0545 Inpatient Gastroenterology Consult Completed           Procedures Performed: Procedure(s):  CHOLECYSTECTOMY LAPAROSCOPIC INTRAOPERATIVE CHOLANGIOGRAM                Pertinent Test Results:   Lab Results (last 24 hours)     Procedure Component Value Units Date/Time    Potassium [[id number]]  (Normal) Collected:  06/19/20 0609    Specimen:  Blood Updated:  06/19/20 0649     Potassium 4.5 mmol/L     Comprehensive Metabolic Panel [[id number]]  (Abnormal) Collected:  06/19/20 0609    Specimen:  Blood Updated:  06/19/20 0649     Glucose 122 mg/dL      BUN 18 mg/dL      Creatinine 0.92 mg/dL      Sodium 137 mmol/L      Potassium 4.5 mmol/L      Chloride 110 mmol/L      CO2 19.0 mmol/L      Calcium 8.4 mg/dL      Total Protein 6.0 g/dL      Albumin 3.10 g/dL      ALT (SGPT) 46 U/L      AST (SGOT) 59 U/L      Alkaline Phosphatase 194 U/L      Total Bilirubin 1.3 mg/dL      eGFR Non African Amer 79 mL/min/1.73      Globulin 2.9 gm/dL      A/G Ratio 1.1 g/dL      BUN/Creatinine Ratio 19.6     Anion Gap 8.0 mmol/L     Narrative:       GFR Normal >60  Chronic Kidney Disease <60  Kidney Failure <15      CBC & Differential [[id number]] Collected:  06/19/20 0608    Specimen:  Blood Updated:  06/19/20 0638    Narrative:       The following orders were created for panel order CBC &  Differential.  Procedure                               Abnormality         Status                     ---------                               -----------         ------                     CBC Auto Differential[[id number]]        Abnormal            Final result                 Please view results for these tests on the individual orders.    CBC Auto Differential [[id number]]  (Abnormal) Collected:  06/19/20 0608    Specimen:  Blood Updated:  06/19/20 0638     WBC 7.01 10*3/mm3      RBC 3.63 10*6/mm3      Hemoglobin 11.2 g/dL      Hematocrit 33.4 %      MCV 92.0 fL      MCH 30.9 pg      MCHC 33.5 g/dL      RDW 13.1 %      RDW-SD 44.1 fl      MPV 10.9 fL      Platelets 146 10*3/mm3      Neutrophil % 81.0 %      Lymphocyte % 11.6 %      Monocyte % 6.6 %      Eosinophil % 0.0 %      Basophil % 0.1 %      Immature Grans % 0.7 %      Neutrophils, Absolute 5.68 10*3/mm3      Lymphocytes, Absolute 0.81 10*3/mm3      Monocytes, Absolute 0.46 10*3/mm3      Eosinophils, Absolute 0.00 10*3/mm3      Basophils, Absolute 0.01 10*3/mm3      Immature Grans, Absolute 0.05 10*3/mm3      nRBC 0.0 /100 WBC     Blood Culture - Blood, Arm, Right [[id number]] Collected:  06/15/20 1511    Specimen:  Blood from Arm, Right Updated:  06/18/20 1530     Blood Culture No growth at 3 days    Blood Culture - Blood, Arm, Right [[id number]] Collected:  06/15/20 1318    Specimen:  Blood from Arm, Right Updated:  06/18/20 1330     Blood Culture No growth at 3 days    Tissue Pathology Exam [[id number]] Collected:  06/18/20 1121    Specimen:  Tissue from Gallbladder Updated:  06/18/20 1315        Imaging Results (All)     Procedure Component Value Units Date/Time    FL C Arm During Surgery [[id number]] Resulted:  06/19/20 0807     Updated:  06/19/20 0807    NM Hepatobiliary Without CCK [[id number]] Collected:  06/16/20 1158     Updated:  06/16/20 1431    Narrative:       Nuclear medicine hepatobiliary scan.     CLINICAL HISTORY:   Right upper quadrant symptoms       COMPARISON EXAMINATIONS:    Ultrasound Chloé 15, 2020.     FINDINGS:      A Nuclear Medicine hepatobiliary imaging examination was  performed, following the administration of 6.5   millicuries of  Tc labeled Choletec and images obtained as a dynamic acquisition.       There is prompt clearance of tracer from the blood pool into the  hepatic parenchyma, with progression of tracer into the  intrahepatic biliary radicals in an appropriate amount of time.    The gall bladder is not visualized in an appropriate period of  time. (90 minutes following injection).    There is progression of tracer through the common bile duct, and  into the small bowel in an appropriate period of time.      Impression:       1. Nonvisualization of the gallbladder up to 90 minutes following  injection of radionuclide. This suggests acute cholecystitis.    2.  Patent common bile duct.    Electronically signed by:  Roque Huang MD  6/16/2020 2:30 PM CDT  Workstation: [id number]    US Gallbladder [[id number]] Collected:  06/15/20 1058     Updated:  06/15/20 1146    Narrative:       EXAM DESCRIPTION:     US GALLBLADDER    CLINICAL HISTORY:     84 years  Male  acute pancreatitis    COMPARISON:     None available    TECHNIQUE:     Transverse and longitudinal images were obtained throughout the  right upper quadrant from a transabdominal approach.    FINDINGS:     The gallbladder is well-distended. There is no evidence of  cholelithiasis, gallbladder wall thickening or pericholecystic  fluid. The common bile duct measures 4 mm in diameter without  evidence of intra or extrahepatic bile or dilatation.    Images through the liver demonstrate no focal abnormality with  normal echogenicity. The right kidney demonstrates no evidence of  obstruction. There is diffuse cortical thinning of the right  renal parenchyma.    The pancreas is fairly well visualized and demonstrates no focal  abnormality. There are no peripancreatic fluid  collections/effusion  "and/or pseudocyst identified.      Impression:           1. No evidence of cholelithiasis.  2. Unremarkable ultrasonographic appearance of the pancreas.  3. Mild cortical thinning of the right renal cortex.        Electronically signed by:  Teresita Richards MD  6/15/2020 11:45 AM  CDT Workstation: [id number]            Chief Complaint on Day of Discharge: none     Hospital Course:  84-year-old  male with past medical history of hypertension who presented on 6/15/2020 with complaints of abdominal pain.  Patient was noted to have acute pancreatitis at an outside facility.  CT of abdomen and pelvis was also remarkable for pericholecystic fluid.  He was admitted for acute cholecystitis and pancreatitis with GI and general surgery versus consulted.  Patient ultimately required cholecystectomy with Dr. Miramontes on 6/18/2020.  Patient was stable postoperatively with no complications.  Patient's lipase levels trended downward and patient was cleared for discharge by both GI and general surgery services.  General surgery has instructed for 1 week follow-up.  Gastroenterology has instructed for 2-week follow-up.  Patient be discharged home today in stable condition with instructions for 1 week follow-up with PCP as well.  Patient is provided with prescription for [place] as needed at discharge for pain relief.  [facility] #[id number] reviewed and no current prescriptions for controlled substances noted.     Condition on Discharge:  Stable     Physical Exam on Discharge:  BP (!) 188/76 (BP Location: Left arm, Patient Position: Sitting)   Pulse 65   Temp 97.8 °F (36.6 °C) (Axillary)   Resp 16   Ht 177.8 cm (70\")   Wt 70.1 kg (154 lb 9.6 oz)   SpO2 95%   BMI 22.18 kg/m²   Physical Exam   Constitutional: He is oriented to person, place, and time. He appears well-developed and well-nourished. No distress.   HENT:   Head: Normocephalic and atraumatic.   Right Ear: External ear normal.   Left Ear: External ear normal.   Nose: Nose " normal.   Eyes: Conjunctivae are normal. Right eye exhibits no discharge. Left eye exhibits no discharge. No scleral icterus.   Neck: Normal range of motion. Neck supple. No JVD present.   Cardiovascular: Normal rate, regular rhythm, normal heart sounds and intact distal pulses. Exam reveals no gallop and no friction rub.   No murmur heard.  Pulmonary/Chest: Effort normal and breath sounds normal. No stridor. No respiratory distress. He has no wheezes. He has no rales.   Abdominal: Soft. Bowel sounds are normal. He exhibits no distension. There is no tenderness.   Lap santa incisions clean, dry, intact.    Musculoskeletal: Normal range of motion. He exhibits no edema.   Neurological: He is alert and oriented to person, place, and time.   Skin: Skin is warm and dry. He is not diaphoretic.   Psychiatric: He has a normal mood and affect. His behavior is normal.   Nursing note and vitals reviewed.        Discharge Disposition:  Home or Self Care    Discharge Medications:     Discharge Medications      New Medications      Instructions Start Date   famotidine 40 MG tablet  Commonly known as:  PEPCID   40 mg, Oral, Daily   Start Date:  June 20, 2020     HYDROcodone-acetaminophen 7.5-325 MG per tablet  Commonly known as:  NORCO   1 tablet, Oral, Every 6 Hours PRN      magnesium hydroxide 2400 MG/10ML suspension suspension  Commonly known as:  MILK OF MAGNESIA   10 mL, Oral, Daily PRN      ondansetron 4 MG tablet  Commonly known as:  ZOFRAN   4 mg, Oral, Every 6 Hours PRN         Continue These Medications      Instructions Start Date   furosemide 20 MG tablet  Commonly known as:  LASIX   20 mg, Oral, Daily PRN, 1/2 tablet prn for leg swelling       metoprolol tartrate 100 MG tablet  Commonly known as:  LOPRESSOR   100 mg, Oral, 2 Times Daily      vitamin B-12 500 MCG tablet  Commonly known as:  CYANOCOBALAMIN   500 mcg, Oral, Daily             Discharge Diet:   Diet Instructions     Diet: Cardiac; Thin      Discharge Diet:   Cardiac    Fluid Consistency:  Thin          Activity at Discharge:   Activity Instructions     Activity as Tolerated            Discharge Care Plan/Instructions: Follow up with PCP within one week, Follow up with Dr. Miramontes in one week, follow up with Dr. Stringer in two weeks.     Follow-up Appointments:   Additional Instructions for the Follow-ups that You Need to Schedule     Discharge Follow-up with PCP   As directed       Currently Documented PCP:    Provider, No Known    PCP Phone Number:    None     Follow Up Details:  one week         Discharge Follow-up with Specified Provider: Kulwinder; 1 Week   As directed      To:  Kulwinder    Follow Up:  1 Week         Discharge Follow-up with Specified Provider: Siomara; 2 Weeks   As directed      To:  Siomara    Follow Up:  2 Weeks           Follow-up Information     Provider, No Known Follow up.    Why:  Dr Rose Via Telephone visit on Monday June 22 @ 1030  If you do not get a phone call please call the office   Contact information:  [place] [address]             Kirby Miramontes MD Follow up.    Specialty:  General Surgery  Contact information:  [address] [place] [address] [address]  [address]  [facility] [address] [phone]             Hipolito Stringer MD Follow up.    Specialty:  Gastroenterology  Contact information:  [address] [place] [address]   3RD FLR  [place] [id number] [phone]                   Test Results Pending at Discharge:    Order Current Status    Tissue Pathology Exam In process    Blood Culture - Blood, Arm, Right Preliminary result    Blood Culture - Blood, Arm, Right Preliminary result                This document has been electronically signed by APPLE Kidd on June 19, 2020 13:13        Time: 30 minutes spent on the assessment, discussion, management, and discharge planning for this patient.

## 2020-06-20 ENCOUNTER — NURSE TRIAGE (OUTPATIENT)
Dept: CALL CENTER | Facility: HOSPITAL | Age: 85
End: 2020-06-20

## 2020-06-20 ENCOUNTER — READMISSION MANAGEMENT (OUTPATIENT)
Dept: CALL CENTER | Facility: HOSPITAL | Age: 85
End: 2020-06-20

## 2020-06-20 LAB
BACTERIA SPEC AEROBE CULT: NORMAL
BACTERIA SPEC AEROBE CULT: NORMAL

## 2020-06-20 NOTE — OUTREACH NOTE
Prep Survey      Responses   [facility]?  [facility]   Is LACE score < 7 ?  No   Eligibility  Readm Mgmt   Discharge diagnosis  Acute pancreatitis, acute cholecystitis, s/p lap cholecystectomy intraoperative cholangiogram, essential HTN   COVID-19 Test Status  Negative   Does the patient have one of the following disease processes/diagnoses(primary or secondary)?  General Surgery   Does the patient have Home health ordered?  No   Is there a DME ordered?  No   Comments regarding appointments  see AVS   Medication alerts for this patient  see AVS   Prep survey completed?  Yes          Millie Brannon RN

## 2020-06-20 NOTE — TELEPHONE ENCOUNTER
Advised to stop taking the Narco and call his surgeon for a replacement medication.      Reason for Disposition  • Hives or itching    Additional Information  • Negative: [1] Life-threatening reaction (anaphylaxis) in the past to the same drug AND [2] < 2 hours since exposure  • Negative: Difficulty breathing or wheezing  • Negative: [1] Hoarseness or cough AND [2] started soon after 1st dose of drug  • Negative: [1] Swollen tongue AND [2] started soon after 1st dose of drug  • Negative: [1] Purple or blood-colored rash (spots or dots) AND [2] fever  • Negative: Sounds like a life-threatening emergency to the triager  • Negative: Rash is only on 1 part of the body (localized)  • Negative: Taking new non-prescription (OTC) antihistamine, decongestant, ear drops, eye drops, or other OTC cough/cold medicine  • Negative: Taking new prescription antihistamine, allergy medicine, asthma medicine, eye drops, ear drops or nose drops  • Negative: Rash started more than 3 days after stopping new prescription medicine  • Negative: Swollen tongue  • Negative: [1] Widespread hives AND [2] onset < 2 hours of exposure to 1st dose of drug  • Negative: Fever  • Negative: Patient sounds very sick or weak to the triager  • Negative: [1] Purple or blood-colored rash (spots or dots) AND [2] no fever AND [3] sounds well to triager  • Negative: [1] Taking new prescription medication AND [2] rash within 4 hours of 1st dose  • Negative: Large or small blisters on skin (i.e., fluid filled bubbles or sacs)  • Negative: Bloody crusts on lips or sores in mouth  • Negative: Face or lip swelling  • Negative: Taking new prescription antibiotic (Exception: finished taking new prescription antibiotic)  • Negative: Taking new prescription medicine  (Exceptions: finished taking new prescription antibiotic OR questions about flushing from niacin)  • Negative: Rash started within 3 days after antibiotic stopped  • Negative: Niacin flush, questions  "about  • Negative: Vancomycin flush, questions about    Answer Assessment - Initial Assessment Questions  1. APPEARANCE of RASH: \"Describe the rash.\" (e.g., spots, blisters, raised areas, skin peeling, scaly)     Itchy red spots.    2. SIZE: \"How big are the spots?\" (e.g., tip of pen, eraser, coin; inches, centimeters)      Small   3. LOCATION: \"Where is the rash located?\"      On hands and arms.   4. COLOR: \"What color is the rash?\" (Note: It is difficult to assess rash color in people with darker-colored skin. When this situation occurs, simply ask the caller to describe what they see.)      Red   5. ONSET: \"When did the rash begin?\"      Today.   6. FEVER: \"Do you have a fever?\" If so, ask: \"What is your temperature, how was it measured, and when did it start?\"      No   7. ITCHING: \"Does the rash itch?\" If so, ask: \"How bad is the itch?\" (Scale 1-10; or mild, moderate, severe)      Moderate   8. CAUSE: \"What do you think is causing the rash?\"      Taking Narco for post operative pain from gallbladder surgery.    9. NEW MEDICATION: \"What new medication are you taking?\" (e.g., name of antibiotic) \"When did you start taking this medication?\".      Narco, has had three doses.    10. OTHER SYMPTOMS: \"Do you have any other symptoms?\" (e.g., sore throat, fever, joint pain)        No   11. PREGNANCY: \"Is there any chance you are pregnant?\" \"When was your last menstrual period?\"        Male    Protocols used: RASH - WIDESPREAD ON DRUGS-ADULT-AH      "

## 2020-06-23 LAB
LAB AP CASE REPORT: NORMAL
PATH REPORT.FINAL DX SPEC: NORMAL

## 2020-06-24 ENCOUNTER — READMISSION MANAGEMENT (OUTPATIENT)
Dept: CALL CENTER | Facility: HOSPITAL | Age: 85
End: 2020-06-24

## 2020-06-24 NOTE — OUTREACH NOTE
General Surgery Week 1 Survey      Responses   [facility] patient discharged from?  [place]   Does the patient have one of the following disease processes/diagnoses(primary or secondary)?  General Surgery   Is there a successful TCM telephone encounter documented?  No   Week 1 attempt successful?  No   Unsuccessful attempts  Attempt 1          Shayna Ramsey RN

## 2020-06-25 ENCOUNTER — READMISSION MANAGEMENT (OUTPATIENT)
Dept: CALL CENTER | Facility: HOSPITAL | Age: 85
End: 2020-06-25

## 2020-06-25 NOTE — OUTREACH NOTE
General Surgery Week 1 Survey      Responses   [facility] patient discharged from?  [place]   Does the patient have one of the following disease processes/diagnoses(primary or secondary)?  General Surgery   Is there a successful TCM telephone encounter documented?  No   Week 1 attempt successful?  Yes   Call start time  [id number]   Call end time  1902   Discharge diagnosis  Acute pancreatitis, acute cholecystitis, s/p lap cholecystectomy intraoperative cholangiogram, essential HTN   Is patient permission given to speak with other caregiver?  Yes   Person spoke with today (if not patient) and relationship  Spouse   Meds reviewed with patient/caregiver?  Yes   Is the patient having any side effects they believe may be caused by any medication additions or changes?  Yes   Side effects comments   Norco caused itching.   Does the patient have all medications related to this admission filled (includes all antibiotics, pain medications, etc.)  Yes   Is the patient taking all medications as directed (includes completed medication regime)?  Yes   Does the patient have a follow up appointment scheduled with their surgeon?  Yes   Has the patient kept scheduled appointments due by today?  N/A   Has home health visited the patient within 72 hours of discharge?  N/A   Psychosocial issues?  No   Did the patient receive a copy of their discharge instructions?  Yes   Nursing interventions  Reviewed instructions with patient   What is the patient's perception of their health status since discharge?  Improving   Nursing interventions  Nurse provided patient education   Is the patient /caregiver able to teach back basic post-op care?  Continue use of incentive spirometry at least 1 week post discharge, Practice 'cough and deep breath', Lifting as instructed by MD in discharge instructions, Do not remove steri-strips, Keep incision areas clean,dry and protected, No tub bath, swimming, or hot tub until instructed by MD   Is the  patient/caregiver able to teach back signs and symptoms of incisional infection?  Increased redness, swelling or pain at the incisonal site, Increased drainage or bleeding, Incisional warmth, Pus or odor from incision, Fever   Is the patient/caregiver able to teach back steps to recovery at home?  Set small, achievable goals for return to baseline health, Rest and rebuild strength, gradually increase activity, Eat a well-balance diet, Make a list of questions for surgeon's appointment   Is the patient/caregiver able to teach back the hierarchy of who to call/visit for symptoms/problems? PCP, Specialist, Home health nurse, Urgent Care, ED, 911  Yes   Additional teach back comments  Incisions look good, encouraged water, discouraged sweet tea and fried food.  Afebrile.   Week 1 call completed?  Yes   Wrap up additional comments  Improving.          Lilo Lopes RN

## 2020-06-26 ENCOUNTER — OFFICE VISIT (OUTPATIENT)
Dept: SURGERY | Facility: CLINIC | Age: 85
End: 2020-06-26

## 2020-06-26 VITALS
HEIGHT: 70 IN | SYSTOLIC BLOOD PRESSURE: 142 MMHG | TEMPERATURE: 97.6 F | WEIGHT: 151 LBS | BODY MASS INDEX: 21.62 KG/M2 | HEART RATE: 81 BPM | DIASTOLIC BLOOD PRESSURE: 20 MMHG

## 2020-06-26 DIAGNOSIS — K85.00 IDIOPATHIC ACUTE PANCREATITIS WITHOUT INFECTION OR NECROSIS: ICD-10-CM

## 2020-06-26 DIAGNOSIS — K81.0 ACUTE CHOLECYSTITIS: Primary | ICD-10-CM

## 2020-06-26 PROCEDURE — 99024 POSTOP FOLLOW-UP VISIT: CPT | Performed by: SURGERY

## 2020-06-26 NOTE — PROGRESS NOTES
84-year-old gentleman who is now 8 days status post laparoscopic cholecystectomy with normal interoperative cholangiogram for both acute and chronic cholecystitis.  Patient also had a recent episode of pancreatitis but there is no evidence that the patient had gallstones at any point.  He feels much better.  His pancreatitis is essentially resolved as well.  We discussed etiologies for pancreatitis he clearly understands.  The small amount of alcohol that he drinks few times a week may be related he understands that.  For his gallbladder surgery is concerned he has recovered from that he has no complaints.  He is nonicteric his incisions are clean his abdomen is soft.  He will follow-up with us on apparent basis

## 2020-07-02 ENCOUNTER — OFFICE VISIT (OUTPATIENT)
Dept: GASTROENTEROLOGY | Facility: CLINIC | Age: 85
End: 2020-07-02

## 2020-07-02 DIAGNOSIS — R79.89 ELEVATED LFTS: Primary | ICD-10-CM

## 2020-07-02 PROCEDURE — 99441 PR PHYS/QHP TELEPHONE EVALUATION 5-10 MIN: CPT | Performed by: INTERNAL MEDICINE

## 2020-07-02 NOTE — PROGRESS NOTES
No chief complaint on file.  pancreatitis  You have chosen to receive care through a telephone visit. Do you consent to use a telephone visit for your medical care today? Yes    Subjective    Kory Montalvo is a 84 y.o. male.    History of Present Illness    Patient had a 10-minute telephone follow-up visit today due to current pandemic.  Had a recent bout of acute biliary pancreatitis for which he underwent cholecystectomy.  Had elevated liver enzymes during the hospital stay.  He feels better currently.  No further abdominal pain, nausea or vomiting.  Tolerating diet well.  Seen by Dr. Miramontes.      The following portions of the patient's history were reviewed and updated as appropriate:   No past medical history on file.  Past Surgical History:   Procedure Laterality Date   • CHOLECYSTECTOMY WITH INTRAOPERATIVE CHOLANGIOGRAM N/A 6/18/2020    Procedure: CHOLECYSTECTOMY LAPAROSCOPIC INTRAOPERATIVE CHOLANGIOGRAM;  Surgeon: Kirby Miramontes MD;  Location: [facility];  Service: General;  Laterality: N/A;     No family history on file.    Prior to Admission medications    Medication Sig Start Date End Date Taking? Authorizing Provider   famotidine (PEPCID) 40 MG tablet Take 1 tablet by mouth Daily. 6/20/20   Atiya Duran APRN   furosemide (LASIX) 20 MG tablet Take 20 mg by mouth Daily As Needed. 1/2 tablet prn for leg swelling    ProviderTheron MD   magnesium hydroxide (MILK OF MAGNESIA) 2400 MG/10ML suspension suspension Take 10 mL by mouth Daily As Needed (constipation). 6/19/20   Atiya Duran APRN   metoprolol tartrate (LOPRESSOR) 100 MG tablet Take 100 mg by mouth 2 (Two) Times a Day.    ProviderTheron MD   ondansetron (ZOFRAN) 4 MG tablet Take 1 tablet by mouth Every 6 (Six) Hours As Needed for Nausea or Vomiting. 6/19/20   Atiya Duran APRN   vitamin B-12 (CYANOCOBALAMIN) 500 MCG tablet Take 500 mcg by mouth Daily.    ProviderTheron MD     No Known Allergies  Social History      Socioeconomic History   • Marital status:      Spouse name: Not on file   • Number of children: Not on file   • Years of education: Not on file   • Highest education level: Not on file   Tobacco Use   • Smoking status: Former Smoker     Packs/day: 1.50     Years: 52.00     Pack years: 78.00     Types: Cigarettes   • Smokeless tobacco: Former User     Types: Chew       Review of Systems  Review of Systems   Constitutional: Negative for chills, fatigue, fever and unexpected weight change.   HENT: Negative for congestion, ear discharge, hearing loss, nosebleeds and sore throat.    Eyes: Negative for pain, discharge and redness.   Respiratory: Negative for cough, chest tightness, shortness of breath and wheezing.    Cardiovascular: Negative for chest pain and palpitations.   Gastrointestinal: Negative for abdominal distention, abdominal pain, blood in stool, constipation, diarrhea, nausea and vomiting.   Endocrine: Negative for cold intolerance, polydipsia, polyphagia and polyuria.   Genitourinary: Negative for dysuria, flank pain, frequency, hematuria and urgency.   Musculoskeletal: Negative for arthralgias, back pain, joint swelling and myalgias.   Skin: Negative for color change, pallor and rash.   Neurological: Negative for tremors, seizures, syncope, weakness and headaches.   Hematological: Negative for adenopathy. Does not bruise/bleed easily.   Psychiatric/Behavioral: Negative for behavioral problems, confusion, dysphoric mood, hallucinations and suicidal ideas. The patient is not nervous/anxious.         There were no vitals taken for this visit.  Telephone visit    Objective    Physical Exam telephone visit  No results displayed because visit has over 200 results.        Assessment/Plan      1. Elevated LFTs    1.  Acute biliary pancreatitis, resolved.  Status post cholecystectomy.  2.  Elevated liver enzymes, improving.  Likely due to gallstone passage.  Repeat LFTs today.        Orders placed  during this encounter include:  Orders Placed This Encounter   Procedures   • Comprehensive Metabolic Panel       * Surgery not found *    Review and/or summary of lab tests, radiology, procedures, medications. Review and summary of old records and obtaining of history. The risks and benefits of my recommendations, as well as other treatment options were discussed with the patient and wife today. Questions were answered.    No orders of the defined types were placed in this encounter.      Follow-up: No follow-ups on file.               Results for orders placed or performed during the hospital encounter of 06/15/20   COVID-19, [facility] IN-HOUSE, NP SWAB IN TRANSPORT MEDIA 8-10 HR TAT - Swab, Nasopharynx   Result Value Ref Range    COVID19 Not Detected Not Detected - Ref. Range   CBC Auto Differential   Result Value Ref Range    WBC 7.01 3.40 - 10.80 10*3/mm3    RBC 3.63 (L) 4.14 - 5.80 10*6/mm3    Hemoglobin 11.2 (L) 13.0 - 17.7 g/dL    Hematocrit 33.4 (L) 37.5 - 51.0 %    MCV 92.0 79.0 - 97.0 fL    MCH 30.9 26.6 - 33.0 pg    MCHC 33.5 31.5 - 35.7 g/dL    RDW 13.1 12.3 - 15.4 %    RDW-SD 44.1 37.0 - 54.0 fl    MPV 10.9 6.0 - 12.0 fL    Platelets 146 140 - 450 10*3/mm3    Neutrophil % 81.0 (H) 42.7 - 76.0 %    Lymphocyte % 11.6 (L) 19.6 - 45.3 %    Monocyte % 6.6 5.0 - 12.0 %    Eosinophil % 0.0 (L) 0.3 - 6.2 %    Basophil % 0.1 0.0 - 1.5 %    Immature Grans % 0.7 (H) 0.0 - 0.5 %    Neutrophils, Absolute 5.68 1.70 - 7.00 10*3/mm3    Lymphocytes, Absolute 0.81 0.70 - 3.10 10*3/mm3    Monocytes, Absolute 0.46 0.10 - 0.90 10*3/mm3    Eosinophils, Absolute 0.00 0.00 - 0.40 10*3/mm3    Basophils, Absolute 0.01 0.00 - 0.20 10*3/mm3    Immature Grans, Absolute 0.05 0.00 - 0.05 10*3/mm3    nRBC 0.0 0.0 - 0.2 /100 WBC   CBC Auto Differential   Result Value Ref Range    WBC 6.34 3.40 - 10.80 10*3/mm3    RBC 3.88 (L) 4.14 - 5.80 10*6/mm3    Hemoglobin 11.9 (L) 13.0 - 17.7 g/dL    Hematocrit 34.5 (L) 37.5 - 51.0 %    MCV 88.9  79.0 - 97.0 fL    MCH 30.7 26.6 - 33.0 pg    MCHC 34.5 31.5 - 35.7 g/dL    RDW 12.8 12.3 - 15.4 %    RDW-SD 42.0 37.0 - 54.0 fl    MPV 10.7 6.0 - 12.0 fL    Platelets 147 140 - 450 10*3/mm3    Neutrophil % 67.6 42.7 - 76.0 %    Lymphocyte % 18.0 (L) 19.6 - 45.3 %    Monocyte % 9.6 5.0 - 12.0 %    Eosinophil % 3.8 0.3 - 6.2 %    Basophil % 0.2 0.0 - 1.5 %    Immature Grans % 0.8 (H) 0.0 - 0.5 %    Neutrophils, Absolute 4.29 1.70 - 7.00 10*3/mm3    Lymphocytes, Absolute 1.14 0.70 - 3.10 10*3/mm3    Monocytes, Absolute 0.61 0.10 - 0.90 10*3/mm3    Eosinophils, Absolute 0.24 0.00 - 0.40 10*3/mm3    Basophils, Absolute 0.01 0.00 - 0.20 10*3/mm3    Immature Grans, Absolute 0.05 0.00 - 0.05 10*3/mm3    nRBC 0.0 0.0 - 0.2 /100 WBC   CBC Auto Differential   Result Value Ref Range    WBC 5.48 3.40 - 10.80 10*3/mm3    RBC 3.74 (L) 4.14 - 5.80 10*6/mm3    Hemoglobin 11.3 (L) 13.0 - 17.7 g/dL    Hematocrit 33.6 (L) 37.5 - 51.0 %    MCV 89.8 79.0 - 97.0 fL    MCH 30.2 26.6 - 33.0 pg    MCHC 33.6 31.5 - 35.7 g/dL    RDW 12.8 12.3 - 15.4 %    RDW-SD 42.1 37.0 - 54.0 fl    MPV 10.8 6.0 - 12.0 fL    Platelets 123 (L) 140 - 450 10*3/mm3    Neutrophil % 79.7 (H) 42.7 - 76.0 %    Lymphocyte % 8.2 (L) 19.6 - 45.3 %    Monocyte % 8.9 5.0 - 12.0 %    Eosinophil % 2.6 0.3 - 6.2 %    Basophil % 0.2 0.0 - 1.5 %    Immature Grans % 0.4 0.0 - 0.5 %    Neutrophils, Absolute 4.37 1.70 - 7.00 10*3/mm3    Lymphocytes, Absolute 0.45 (L) 0.70 - 3.10 10*3/mm3    Monocytes, Absolute 0.49 0.10 - 0.90 10*3/mm3    Eosinophils, Absolute 0.14 0.00 - 0.40 10*3/mm3    Basophils, Absolute 0.01 0.00 - 0.20 10*3/mm3    Immature Grans, Absolute 0.02 0.00 - 0.05 10*3/mm3    nRBC 0.0 0.0 - 0.2 /100 WBC   CBC Auto Differential   Result Value Ref Range    WBC 8.59 3.40 - 10.80 10*3/mm3    RBC 3.76 (L) 4.14 - 5.80 10*6/mm3    Hemoglobin 11.3 (L) 13.0 - 17.7 g/dL    Hematocrit 34.2 (L) 37.5 - 51.0 %    MCV 91.0 79.0 - 97.0 fL    MCH 30.1 26.6 - 33.0 pg    MCHC 33.0  31.5 - 35.7 g/dL    RDW 12.9 12.3 - 15.4 %    RDW-SD 42.0 37.0 - 54.0 fl    MPV 10.7 6.0 - 12.0 fL    Platelets 120 (L) 140 - 450 10*3/mm3    Neutrophil % 84.4 (H) 42.7 - 76.0 %    Lymphocyte % 7.8 (L) 19.6 - 45.3 %    Monocyte % 6.8 5.0 - 12.0 %    Eosinophil % 0.5 0.3 - 6.2 %    Basophil % 0.2 0.0 - 1.5 %    Immature Grans % 0.3 0.0 - 0.5 %    Neutrophils, Absolute 7.25 (H) 1.70 - 7.00 10*3/mm3    Lymphocytes, Absolute 0.67 (L) 0.70 - 3.10 10*3/mm3    Monocytes, Absolute 0.58 0.10 - 0.90 10*3/mm3    Eosinophils, Absolute 0.04 0.00 - 0.40 10*3/mm3    Basophils, Absolute 0.02 0.00 - 0.20 10*3/mm3    Immature Grans, Absolute 0.03 0.00 - 0.05 10*3/mm3    nRBC 0.0 0.0 - 0.2 /100 WBC   CBC Auto Differential   Result Value Ref Range    WBC 17.00 (H) 3.40 - 10.80 10*3/mm3    RBC 3.83 (L) 4.14 - 5.80 10*6/mm3    Hemoglobin 11.9 (L) 13.0 - 17.7 g/dL    Hematocrit 34.2 (L) 37.5 - 51.0 %    MCV 89.3 79.0 - 97.0 fL    MCH 31.1 26.6 - 33.0 pg    MCHC 34.8 31.5 - 35.7 g/dL    RDW 12.7 12.3 - 15.4 %    RDW-SD 41.7 37.0 - 54.0 fl    MPV 10.4 6.0 - 12.0 fL    Platelets 138 (L) 140 - 450 10*3/mm3    Neutrophil % 82.5 (H) 42.7 - 76.0 %    Lymphocyte % 8.0 (L) 19.6 - 45.3 %    Monocyte % 8.5 5.0 - 12.0 %    Eosinophil % 0.1 (L) 0.3 - 6.2 %    Basophil % 0.1 0.0 - 1.5 %    Immature Grans % 0.8 (H) 0.0 - 0.5 %    Neutrophils, Absolute 14.04 (H) 1.70 - 7.00 10*3/mm3    Lymphocytes, Absolute 1.36 0.70 - 3.10 10*3/mm3    Monocytes, Absolute 1.44 (H) 0.10 - 0.90 10*3/mm3    Eosinophils, Absolute 0.01 0.00 - 0.40 10*3/mm3    Basophils, Absolute 0.02 0.00 - 0.20 10*3/mm3    Immature Grans, Absolute 0.13 (H) 0.00 - 0.05 10*3/mm3    nRBC 0.0 0.0 - 0.2 /100 WBC     *Note: Due to a large number of results and/or encounters for the requested time period, some results have not been displayed. A complete set of results can be found in Results Review.         This document has been electronically signed by Hipolito Stringer MD on July 2, 2020 15:22

## 2020-08-03 ENCOUNTER — OFFICE VISIT (OUTPATIENT)
Dept: GASTROENTEROLOGY | Facility: CLINIC | Age: 85
End: 2020-08-03

## 2020-08-03 ENCOUNTER — LAB (OUTPATIENT)
Dept: LAB | Facility: HOSPITAL | Age: 85
End: 2020-08-03

## 2020-08-03 VITALS
BODY MASS INDEX: 21.27 KG/M2 | HEIGHT: 70 IN | WEIGHT: 148.6 LBS | SYSTOLIC BLOOD PRESSURE: 134 MMHG | DIASTOLIC BLOOD PRESSURE: 58 MMHG | HEART RATE: 70 BPM

## 2020-08-03 DIAGNOSIS — K85.10 ACUTE BILIARY PANCREATITIS WITHOUT INFECTION OR NECROSIS: Primary | ICD-10-CM

## 2020-08-03 LAB
ALBUMIN SERPL-MCNC: 4.2 G/DL (ref 3.5–5.2)
ALBUMIN/GLOB SERPL: 1.3 G/DL
ALP SERPL-CCNC: 77 U/L (ref 39–117)
ALT SERPL W P-5'-P-CCNC: 10 U/L (ref 1–41)
ANION GAP SERPL CALCULATED.3IONS-SCNC: 8.4 MMOL/L (ref 5–15)
AST SERPL-CCNC: 18 U/L (ref 1–40)
BILIRUB SERPL-MCNC: 0.8 MG/DL (ref 0–1.2)
BUN SERPL-MCNC: 18 MG/DL (ref 8–23)
BUN/CREAT SERPL: 12.8 (ref 7–25)
CALCIUM SPEC-SCNC: 9.4 MG/DL (ref 8.6–10.5)
CHLORIDE SERPL-SCNC: 104 MMOL/L (ref 98–107)
CO2 SERPL-SCNC: 25.6 MMOL/L (ref 22–29)
CREAT SERPL-MCNC: 1.41 MG/DL (ref 0.76–1.27)
GFR SERPL CREATININE-BSD FRML MDRD: 48 ML/MIN/1.73
GLOBULIN UR ELPH-MCNC: 3.3 GM/DL
GLUCOSE SERPL-MCNC: 125 MG/DL (ref 65–99)
POTASSIUM SERPL-SCNC: 4.6 MMOL/L (ref 3.5–5.2)
PROT SERPL-MCNC: 7.5 G/DL (ref 6–8.5)
SODIUM SERPL-SCNC: 138 MMOL/L (ref 136–145)

## 2020-08-03 PROCEDURE — 99213 OFFICE O/P EST LOW 20 MIN: CPT | Performed by: INTERNAL MEDICINE

## 2020-08-03 PROCEDURE — 80053 COMPREHEN METABOLIC PANEL: CPT | Performed by: INTERNAL MEDICINE

## 2020-08-03 PROCEDURE — 36415 COLL VENOUS BLD VENIPUNCTURE: CPT | Performed by: INTERNAL MEDICINE

## 2020-08-03 NOTE — PROGRESS NOTES
Chief Complaint   Patient presents with   • Follow-up   • Elevated LFTs   • Weight Loss       Subjective    Kory Montalvo is a 84 y.o. male.    History of Present Illness    Patient presented to GI clinic for follow-up visit today he feels well currently.  No GI complaints at this time.  Lost 6 pounds weight since hospital admission in June.  LFTs are improving.  Did not have labs in last telephone office follow-up visit.     The following portions of the patient's history were reviewed and updated as appropriate:   History reviewed. No pertinent past medical history.  Past Surgical History:   Procedure Laterality Date   • CHOLECYSTECTOMY WITH INTRAOPERATIVE CHOLANGIOGRAM N/A 6/18/2020    Procedure: CHOLECYSTECTOMY LAPAROSCOPIC INTRAOPERATIVE CHOLANGIOGRAM;  Surgeon: Kirby Miramontes MD;  Location: [facility];  Service: General;  Laterality: N/A;     History reviewed. No pertinent family history.    Prior to Admission medications    Medication Sig Start Date End Date Taking? Authorizing Provider   famotidine (PEPCID) 40 MG tablet Take 1 tablet by mouth Daily. 6/20/20  Yes Atyia Duran APRN   furosemide (LASIX) 20 MG tablet Take 20 mg by mouth Daily As Needed. 1/2 tablet prn for leg swelling   Yes ProviderTheron MD   magnesium hydroxide (MILK OF MAGNESIA) 2400 MG/10ML suspension suspension Take 10 mL by mouth Daily As Needed (constipation). 6/19/20  Yes Atiya Duran APRN   metoprolol tartrate (LOPRESSOR) 100 MG tablet Take 100 mg by mouth 2 (Two) Times a Day.   Yes Theron Merlos MD   ondansetron (ZOFRAN) 4 MG tablet Take 1 tablet by mouth Every 6 (Six) Hours As Needed for Nausea or Vomiting. 6/19/20  Yes Atiya Duran APRN   vitamin B-12 (CYANOCOBALAMIN) 500 MCG tablet Take 500 mcg by mouth Daily.   Yes Theron Merlos MD     No Known Allergies  Social History     Socioeconomic History   • Marital status:      Spouse name: Not on file   • Number of children: Not on file   • Years  "of education: Not on file   • Highest education level: Not on file   Tobacco Use   • Smoking status: Former Smoker     Packs/day: 1.50     Years: 52.00     Pack years: 78.00     Types: Cigarettes   • Smokeless tobacco: Former User     Types: Chew   Substance and Sexual Activity   • Alcohol use: Yes     Frequency: Never     Comment: \"Maybe an ounce or two a week\"   • Drug use: Never   • Sexual activity: Defer       Review of Systems  Review of Systems   Constitutional: Positive for unexpected weight change. Negative for chills, fatigue and fever.   HENT: Negative for congestion, ear discharge, hearing loss, nosebleeds and sore throat.    Eyes: Negative for pain, discharge and redness.   Respiratory: Negative for cough, chest tightness, shortness of breath and wheezing.    Cardiovascular: Negative for chest pain and palpitations.   Gastrointestinal: Negative for abdominal distention, abdominal pain, blood in stool, constipation, diarrhea, nausea and vomiting.   Endocrine: Negative for cold intolerance, polydipsia, polyphagia and polyuria.   Genitourinary: Negative for dysuria, flank pain, frequency, hematuria and urgency.   Musculoskeletal: Negative for arthralgias, back pain, joint swelling and myalgias.   Skin: Negative for color change, pallor and rash.   Neurological: Negative for tremors, seizures, syncope, weakness and headaches.   Hematological: Negative for adenopathy. Does not bruise/bleed easily.   Psychiatric/Behavioral: Negative for behavioral problems, confusion, dysphoric mood, hallucinations and suicidal ideas. The patient is not nervous/anxious.         /58 (BP Location: Right arm, Patient Position: Sitting)   Pulse 70   Ht 177.8 cm (70\")   Wt 67.4 kg (148 lb 9.6 oz)   BMI 21.32 kg/m²     Objective    Physical Exam   Constitutional: He is oriented to person, place, and time. He appears well-developed and well-nourished.   HENT:   Head: Normocephalic and atraumatic.   Mouth/Throat: Oropharynx " is clear and moist.   Eyes: Pupils are equal, round, and reactive to light. Conjunctivae and EOM are normal.   Neck: Normal range of motion. Neck supple. No thyromegaly present.   Cardiovascular: Normal rate, regular rhythm and normal heart sounds.   No murmur heard.  Pulmonary/Chest: Effort normal and breath sounds normal. He has no wheezes.   Abdominal: Soft. Bowel sounds are normal. He exhibits no distension and no mass. There is no tenderness. No hernia.   Genitourinary:   Genitourinary Comments: No lesions noted   Musculoskeletal: Normal range of motion. He exhibits no edema or tenderness.   Lymphadenopathy:     He has no cervical adenopathy.   Neurological: He is alert and oriented to person, place, and time. No cranial nerve deficit.   Skin: Skin is warm and dry. No rash noted.   Psychiatric: He has a normal mood and affect. Thought content normal.     No results displayed because visit has over 200 results.        Assessment/Plan    No diagnosis found..   1.  Acute biliary pancreatitis status post cholecystectomy.  Patient improving clinically.  2.  Elevated liver enzymes, likely due to gallstone passage.  Improving.  Repeat LFTs today.  Repeat right upper quadrant sonogram if remain elevated.  3.  Weight loss rule out neoplasia.  Add p.o. supplements.  Repeat CT abdomen and pelvis if continues.      Orders placed during this encounter include:  No orders of the defined types were placed in this encounter.      * Surgery not found *    Review and/or summary of lab tests, radiology, procedures, medications. Review and summary of old records and obtaining of history. The risks and benefits of my recommendations, as well as other treatment options were discussed with the patient and wife today. Questions were answered.    No orders of the defined types were placed in this encounter.      Follow-up: No follow-ups on file.               Results for orders placed or performed during the hospital encounter of  06/15/20   COVID-19, [facility] IN-HOUSE, NP SWAB IN TRANSPORT MEDIA 8-10 HR TAT - Swab, Nasopharynx   Result Value Ref Range    COVID19 Not Detected Not Detected - Ref. Range   CBC Auto Differential   Result Value Ref Range    WBC 7.01 3.40 - 10.80 10*3/mm3    RBC 3.63 (L) 4.14 - 5.80 10*6/mm3    Hemoglobin 11.2 (L) 13.0 - 17.7 g/dL    Hematocrit 33.4 (L) 37.5 - 51.0 %    MCV 92.0 79.0 - 97.0 fL    MCH 30.9 26.6 - 33.0 pg    MCHC 33.5 31.5 - 35.7 g/dL    RDW 13.1 12.3 - 15.4 %    RDW-SD 44.1 37.0 - 54.0 fl    MPV 10.9 6.0 - 12.0 fL    Platelets 146 140 - 450 10*3/mm3    Neutrophil % 81.0 (H) 42.7 - 76.0 %    Lymphocyte % 11.6 (L) 19.6 - 45.3 %    Monocyte % 6.6 5.0 - 12.0 %    Eosinophil % 0.0 (L) 0.3 - 6.2 %    Basophil % 0.1 0.0 - 1.5 %    Immature Grans % 0.7 (H) 0.0 - 0.5 %    Neutrophils, Absolute 5.68 1.70 - 7.00 10*3/mm3    Lymphocytes, Absolute 0.81 0.70 - 3.10 10*3/mm3    Monocytes, Absolute 0.46 0.10 - 0.90 10*3/mm3    Eosinophils, Absolute 0.00 0.00 - 0.40 10*3/mm3    Basophils, Absolute 0.01 0.00 - 0.20 10*3/mm3    Immature Grans, Absolute 0.05 0.00 - 0.05 10*3/mm3    nRBC 0.0 0.0 - 0.2 /100 WBC   CBC Auto Differential   Result Value Ref Range    WBC 6.34 3.40 - 10.80 10*3/mm3    RBC 3.88 (L) 4.14 - 5.80 10*6/mm3    Hemoglobin 11.9 (L) 13.0 - 17.7 g/dL    Hematocrit 34.5 (L) 37.5 - 51.0 %    MCV 88.9 79.0 - 97.0 fL    MCH 30.7 26.6 - 33.0 pg    MCHC 34.5 31.5 - 35.7 g/dL    RDW 12.8 12.3 - 15.4 %    RDW-SD 42.0 37.0 - 54.0 fl    MPV 10.7 6.0 - 12.0 fL    Platelets 147 140 - 450 10*3/mm3    Neutrophil % 67.6 42.7 - 76.0 %    Lymphocyte % 18.0 (L) 19.6 - 45.3 %    Monocyte % 9.6 5.0 - 12.0 %    Eosinophil % 3.8 0.3 - 6.2 %    Basophil % 0.2 0.0 - 1.5 %    Immature Grans % 0.8 (H) 0.0 - 0.5 %    Neutrophils, Absolute 4.29 1.70 - 7.00 10*3/mm3    Lymphocytes, Absolute 1.14 0.70 - 3.10 10*3/mm3    Monocytes, Absolute 0.61 0.10 - 0.90 10*3/mm3    Eosinophils, Absolute 0.24 0.00 - 0.40 10*3/mm3    Basophils,  Absolute 0.01 0.00 - 0.20 10*3/mm3    Immature Grans, Absolute 0.05 0.00 - 0.05 10*3/mm3    nRBC 0.0 0.0 - 0.2 /100 WBC   CBC Auto Differential   Result Value Ref Range    WBC 5.48 3.40 - 10.80 10*3/mm3    RBC 3.74 (L) 4.14 - 5.80 10*6/mm3    Hemoglobin 11.3 (L) 13.0 - 17.7 g/dL    Hematocrit 33.6 (L) 37.5 - 51.0 %    MCV 89.8 79.0 - 97.0 fL    MCH 30.2 26.6 - 33.0 pg    MCHC 33.6 31.5 - 35.7 g/dL    RDW 12.8 12.3 - 15.4 %    RDW-SD 42.1 37.0 - 54.0 fl    MPV 10.8 6.0 - 12.0 fL    Platelets 123 (L) 140 - 450 10*3/mm3    Neutrophil % 79.7 (H) 42.7 - 76.0 %    Lymphocyte % 8.2 (L) 19.6 - 45.3 %    Monocyte % 8.9 5.0 - 12.0 %    Eosinophil % 2.6 0.3 - 6.2 %    Basophil % 0.2 0.0 - 1.5 %    Immature Grans % 0.4 0.0 - 0.5 %    Neutrophils, Absolute 4.37 1.70 - 7.00 10*3/mm3    Lymphocytes, Absolute 0.45 (L) 0.70 - 3.10 10*3/mm3    Monocytes, Absolute 0.49 0.10 - 0.90 10*3/mm3    Eosinophils, Absolute 0.14 0.00 - 0.40 10*3/mm3    Basophils, Absolute 0.01 0.00 - 0.20 10*3/mm3    Immature Grans, Absolute 0.02 0.00 - 0.05 10*3/mm3    nRBC 0.0 0.0 - 0.2 /100 WBC   CBC Auto Differential   Result Value Ref Range    WBC 8.59 3.40 - 10.80 10*3/mm3    RBC 3.76 (L) 4.14 - 5.80 10*6/mm3    Hemoglobin 11.3 (L) 13.0 - 17.7 g/dL    Hematocrit 34.2 (L) 37.5 - 51.0 %    MCV 91.0 79.0 - 97.0 fL    MCH 30.1 26.6 - 33.0 pg    MCHC 33.0 31.5 - 35.7 g/dL    RDW 12.9 12.3 - 15.4 %    RDW-SD 42.0 37.0 - 54.0 fl    MPV 10.7 6.0 - 12.0 fL    Platelets 120 (L) 140 - 450 10*3/mm3    Neutrophil % 84.4 (H) 42.7 - 76.0 %    Lymphocyte % 7.8 (L) 19.6 - 45.3 %    Monocyte % 6.8 5.0 - 12.0 %    Eosinophil % 0.5 0.3 - 6.2 %    Basophil % 0.2 0.0 - 1.5 %    Immature Grans % 0.3 0.0 - 0.5 %    Neutrophils, Absolute 7.25 (H) 1.70 - 7.00 10*3/mm3    Lymphocytes, Absolute 0.67 (L) 0.70 - 3.10 10*3/mm3    Monocytes, Absolute 0.58 0.10 - 0.90 10*3/mm3    Eosinophils, Absolute 0.04 0.00 - 0.40 10*3/mm3    Basophils, Absolute 0.02 0.00 - 0.20 10*3/mm3    Immature  Grans, Absolute 0.03 0.00 - 0.05 10*3/mm3    nRBC 0.0 0.0 - 0.2 /100 WBC   CBC Auto Differential   Result Value Ref Range    WBC 17.00 (H) 3.40 - 10.80 10*3/mm3    RBC 3.83 (L) 4.14 - 5.80 10*6/mm3    Hemoglobin 11.9 (L) 13.0 - 17.7 g/dL    Hematocrit 34.2 (L) 37.5 - 51.0 %    MCV 89.3 79.0 - 97.0 fL    MCH 31.1 26.6 - 33.0 pg    MCHC 34.8 31.5 - 35.7 g/dL    RDW 12.7 12.3 - 15.4 %    RDW-SD 41.7 37.0 - 54.0 fl    MPV 10.4 6.0 - 12.0 fL    Platelets 138 (L) 140 - 450 10*3/mm3    Neutrophil % 82.5 (H) 42.7 - 76.0 %    Lymphocyte % 8.0 (L) 19.6 - 45.3 %    Monocyte % 8.5 5.0 - 12.0 %    Eosinophil % 0.1 (L) 0.3 - 6.2 %    Basophil % 0.1 0.0 - 1.5 %    Immature Grans % 0.8 (H) 0.0 - 0.5 %    Neutrophils, Absolute 14.04 (H) 1.70 - 7.00 10*3/mm3    Lymphocytes, Absolute 1.36 0.70 - 3.10 10*3/mm3    Monocytes, Absolute 1.44 (H) 0.10 - 0.90 10*3/mm3    Eosinophils, Absolute 0.01 0.00 - 0.40 10*3/mm3    Basophils, Absolute 0.02 0.00 - 0.20 10*3/mm3    Immature Grans, Absolute 0.13 (H) 0.00 - 0.05 10*3/mm3    nRBC 0.0 0.0 - 0.2 /100 WBC     *Note: Due to a large number of results and/or encounters for the requested time period, some results have not been displayed. A complete set of results can be found in Results Review.         This document has been electronically signed by Hipolito Stringer MD on August 3, 2020 14:20

## 2020-09-03 ENCOUNTER — OFFICE VISIT (OUTPATIENT)
Dept: GASTROENTEROLOGY | Facility: CLINIC | Age: 85
End: 2020-09-03

## 2020-09-03 VITALS
HEIGHT: 70 IN | HEART RATE: 85 BPM | WEIGHT: 152.4 LBS | DIASTOLIC BLOOD PRESSURE: 70 MMHG | SYSTOLIC BLOOD PRESSURE: 160 MMHG | OXYGEN SATURATION: 94 % | BODY MASS INDEX: 21.82 KG/M2

## 2020-09-03 DIAGNOSIS — K85.10 ACUTE BILIARY PANCREATITIS WITHOUT INFECTION OR NECROSIS: Primary | ICD-10-CM

## 2020-09-03 PROCEDURE — 99212 OFFICE O/P EST SF 10 MIN: CPT | Performed by: INTERNAL MEDICINE

## (undated) DEVICE — SYR LUERLOK 20CC BX/50

## (undated) DEVICE — CVR SURG EQUIP BND RECTG 36X28

## (undated) DEVICE — STERILE POLYISOPRENE POWDER-FREE SURGICAL GLOVES WITH EMOLLIENT COATING: Brand: PROTEXIS

## (undated) DEVICE — CORE TRUMPET FOR SINGLE SOLUTION BAG: Brand: CORE DYNAMICS

## (undated) DEVICE — PK LAP CHOLE LF 60

## (undated) DEVICE — SKIN AFFIX SURG ADHESIVE 72/CS 0.55ML: Brand: MEDLINE

## (undated) DEVICE — LUER-LOK 360°: Brand: CONNECTA, LUER-LOK

## (undated) DEVICE — ENDOPOUCH RETRIEVER SPECIMEN RETRIEVAL BAGS: Brand: ENDOPOUCH RETRIEVER

## (undated) DEVICE — BLUNT TIP TROCAR: Brand: AUTO SUTURE

## (undated) DEVICE — HARMONIC ACE +7 LAPAROSCOPIC SHEARS ADVANCED HEMOSTASIS 5MM DIAMETER 36CM SHAFT LENGTH  FOR USE WITH GRAY HAND PIECE ONLY: Brand: HARMONIC ACE

## (undated) DEVICE — MONOPOLAR METZENBAUM SCISSOR TIP, DISPOSABLE: Brand: MONOPOLAR METZENBAUM SCISSOR TIP, DISPOSABLE

## (undated) DEVICE — THE KUMAR CATHETER®, USED IN CONJUNCTION WITH KUMAR CHOLANGIOGRAPHY® CLAMP, IS MEANT TO PROVIDE A MEANS OF LAPAROSCOPIC CHOLANGIOGRAPHY. IT COMPRISES A TRANSLUCENT TUBING ( 76 CM. LENGTH AND 16 GA. ) THAT CARRIES A 19 GA., 1.25 CM LONG NEEDLE AT THE END. THE KUMAR CATHETER® IS USED TO PUNCTURE THE HARTMANN'S POUCH OF THE GALLBLADDER FOR BILIARY ACCESS AND / OR ASPIRATION. PRODUCT IS LATEX FREE.: Brand: KUMAR CATHETER®

## (undated) DEVICE — SUT MONOCRYL 4/0 PS2 27IN Y426H ETY426H

## (undated) DEVICE — ENDOPATH XCEL DILATING TIP TROCARS WITH STABILITY SLEEVES: Brand: ENDOPATH XCEL

## (undated) DEVICE — VISUALIZATION SYSTEM: Brand: CLEARIFY

## (undated) DEVICE — PDS II VLT 0 107CM AG ST3: Brand: ENDOLOOP

## (undated) DEVICE — APPL CHLORAPREP W/TINT 26ML ORNG

## (undated) DEVICE — GLV SURG TRIUMPH LT PF LTX 6.5 STRL

## (undated) DEVICE — SUT VIC 2/0 UR6 27IN VCP602H

## (undated) DEVICE — SYR LUERLOK 30CC

## (undated) DEVICE — GLV SURG TRIUMPH LT PF LTX 7 STRL

## (undated) DEVICE — SOL IRRIG NACL 1000ML